# Patient Record
Sex: FEMALE | Race: WHITE | Employment: STUDENT | URBAN - NONMETROPOLITAN AREA
[De-identification: names, ages, dates, MRNs, and addresses within clinical notes are randomized per-mention and may not be internally consistent; named-entity substitution may affect disease eponyms.]

---
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
## 2021-04-01 ENCOUNTER — HOSPITAL ENCOUNTER (INPATIENT)
Age: 21
LOS: 3 days | Discharge: HOME | DRG: 137 | End: 2021-04-05
Attending: EMERGENCY MEDICINE | Admitting: INTERNAL MEDICINE
Payer: MEDICAID

## 2021-04-01 ENCOUNTER — APPOINTMENT (OUTPATIENT)
Dept: GENERAL RADIOLOGY | Age: 21
DRG: 137 | End: 2021-04-01
Payer: MEDICAID

## 2021-04-01 DIAGNOSIS — U07.1 COVID-19: ICD-10-CM

## 2021-04-01 DIAGNOSIS — R11.0 NAUSEA WITHOUT VOMITING: ICD-10-CM

## 2021-04-01 DIAGNOSIS — J96.01 ACUTE RESPIRATORY FAILURE WITH HYPOXIA (HCC): Primary | ICD-10-CM

## 2021-04-01 LAB
ALBUMIN SERPL-MCNC: 3.8 GM/DL (ref 3.4–5)
ALP BLD-CCNC: 76 IU/L (ref 40–129)
ALT SERPL-CCNC: 21 U/L (ref 10–40)
ANION GAP SERPL CALCULATED.3IONS-SCNC: 9 MMOL/L (ref 4–16)
APTT: 42 SECONDS (ref 25.1–37.1)
AST SERPL-CCNC: 22 IU/L (ref 15–37)
BASOPHILS ABSOLUTE: 0 K/CU MM
BASOPHILS RELATIVE PERCENT: 0 % (ref 0–1)
BILIRUB SERPL-MCNC: 0.2 MG/DL (ref 0–1)
BILIRUBIN DIRECT: 0.2 MG/DL (ref 0–0.3)
BILIRUBIN, INDIRECT: 0 MG/DL (ref 0–0.7)
BUN BLDV-MCNC: 4 MG/DL (ref 6–23)
CALCIUM SERPL-MCNC: 8.5 MG/DL (ref 8.3–10.6)
CHLORIDE BLD-SCNC: 98 MMOL/L (ref 99–110)
CO2: 25 MMOL/L (ref 21–32)
CREAT SERPL-MCNC: 0.7 MG/DL (ref 0.6–1.1)
D DIMER: 453 NG/ML(DDU)
DIFFERENTIAL TYPE: ABNORMAL
EOSINOPHILS ABSOLUTE: 0 K/CU MM
EOSINOPHILS RELATIVE PERCENT: 0 % (ref 0–3)
GFR AFRICAN AMERICAN: >60 ML/MIN/1.73M2
GFR NON-AFRICAN AMERICAN: >60 ML/MIN/1.73M2
GLUCOSE BLD-MCNC: 131 MG/DL (ref 70–99)
HCG QUALITATIVE: NEGATIVE
HCT VFR BLD CALC: 30 % (ref 37–47)
HEMOGLOBIN: 9 GM/DL (ref 12.5–16)
IMMATURE NEUTROPHIL %: 0.3 % (ref 0–0.43)
INR BLD: 1.23 INDEX
LACTATE: 1.2 MMOL/L (ref 0.4–2)
LYMPHOCYTES ABSOLUTE: 0.9 K/CU MM
LYMPHOCYTES RELATIVE PERCENT: 29.9 % (ref 25–45)
MCH RBC QN AUTO: 23.4 PG (ref 27–31)
MCHC RBC AUTO-ENTMCNC: 30 % (ref 32–36)
MCV RBC AUTO: 77.9 FL (ref 78–100)
MONOCYTES ABSOLUTE: 0.2 K/CU MM
MONOCYTES RELATIVE PERCENT: 6.9 % (ref 0–4)
NUCLEATED RBC %: 0 %
PDW BLD-RTO: 13.6 % (ref 11.7–14.9)
PLATELET # BLD: 289 K/CU MM (ref 140–440)
PMV BLD AUTO: 9.3 FL (ref 7.5–11.1)
POTASSIUM SERPL-SCNC: 3.6 MMOL/L (ref 3.5–5.1)
PROTHROMBIN TIME: 14.9 SECONDS (ref 11.7–14.5)
RBC # BLD: 3.85 M/CU MM (ref 4.2–5.4)
SEGMENTED NEUTROPHILS ABSOLUTE COUNT: 1.9 K/CU MM
SEGMENTED NEUTROPHILS RELATIVE PERCENT: 62.9 % (ref 34–64)
SODIUM BLD-SCNC: 132 MMOL/L (ref 135–145)
TOTAL IMMATURE NEUTOROPHIL: 0.01 K/CU MM
TOTAL NUCLEATED RBC: 0 K/CU MM
TOTAL PROTEIN: 7.1 GM/DL (ref 6.4–8.2)
WBC # BLD: 3 K/CU MM (ref 4–10.5)

## 2021-04-01 PROCEDURE — 93005 ELECTROCARDIOGRAM TRACING: CPT | Performed by: EMERGENCY MEDICINE

## 2021-04-01 PROCEDURE — 6360000002 HC RX W HCPCS: Performed by: EMERGENCY MEDICINE

## 2021-04-01 PROCEDURE — 85730 THROMBOPLASTIN TIME PARTIAL: CPT

## 2021-04-01 PROCEDURE — 96375 TX/PRO/DX INJ NEW DRUG ADDON: CPT

## 2021-04-01 PROCEDURE — 94640 AIRWAY INHALATION TREATMENT: CPT

## 2021-04-01 PROCEDURE — 80053 COMPREHEN METABOLIC PANEL: CPT

## 2021-04-01 PROCEDURE — 84484 ASSAY OF TROPONIN QUANT: CPT

## 2021-04-01 PROCEDURE — 85379 FIBRIN DEGRADATION QUANT: CPT

## 2021-04-01 PROCEDURE — 99285 EMERGENCY DEPT VISIT HI MDM: CPT

## 2021-04-01 PROCEDURE — 96374 THER/PROPH/DIAG INJ IV PUSH: CPT

## 2021-04-01 PROCEDURE — 87040 BLOOD CULTURE FOR BACTERIA: CPT

## 2021-04-01 PROCEDURE — 82248 BILIRUBIN DIRECT: CPT

## 2021-04-01 PROCEDURE — 6370000000 HC RX 637 (ALT 250 FOR IP): Performed by: EMERGENCY MEDICINE

## 2021-04-01 PROCEDURE — 71045 X-RAY EXAM CHEST 1 VIEW: CPT

## 2021-04-01 PROCEDURE — 2580000003 HC RX 258: Performed by: EMERGENCY MEDICINE

## 2021-04-01 PROCEDURE — 96361 HYDRATE IV INFUSION ADD-ON: CPT

## 2021-04-01 PROCEDURE — 84703 CHORIONIC GONADOTROPIN ASSAY: CPT

## 2021-04-01 PROCEDURE — 85025 COMPLETE CBC W/AUTO DIFF WBC: CPT

## 2021-04-01 PROCEDURE — 85610 PROTHROMBIN TIME: CPT

## 2021-04-01 PROCEDURE — 83605 ASSAY OF LACTIC ACID: CPT

## 2021-04-01 RX ORDER — DEXAMETHASONE SODIUM PHOSPHATE 10 MG/ML
6 INJECTION, SOLUTION INTRAMUSCULAR; INTRAVENOUS ONCE
Status: COMPLETED | OUTPATIENT
Start: 2021-04-01 | End: 2021-04-01

## 2021-04-01 RX ORDER — ONDANSETRON 2 MG/ML
4 INJECTION INTRAMUSCULAR; INTRAVENOUS ONCE
Status: COMPLETED | OUTPATIENT
Start: 2021-04-01 | End: 2021-04-01

## 2021-04-01 RX ORDER — 0.9 % SODIUM CHLORIDE 0.9 %
1000 INTRAVENOUS SOLUTION INTRAVENOUS ONCE
Status: COMPLETED | OUTPATIENT
Start: 2021-04-01 | End: 2021-04-02

## 2021-04-01 RX ORDER — ALBUTEROL SULFATE 90 UG/1
2 AEROSOL, METERED RESPIRATORY (INHALATION) ONCE
Status: COMPLETED | OUTPATIENT
Start: 2021-04-01 | End: 2021-04-01

## 2021-04-01 RX ADMIN — DEXAMETHASONE SODIUM PHOSPHATE 6 MG: 10 INJECTION, SOLUTION INTRAMUSCULAR; INTRAVENOUS at 23:15

## 2021-04-01 RX ADMIN — ONDANSETRON 4 MG: 2 INJECTION INTRAMUSCULAR; INTRAVENOUS at 23:11

## 2021-04-01 RX ADMIN — ALBUTEROL SULFATE 2 PUFF: 90 AEROSOL, METERED RESPIRATORY (INHALATION) at 22:45

## 2021-04-01 RX ADMIN — SODIUM CHLORIDE 1000 ML: 9 INJECTION, SOLUTION INTRAVENOUS at 23:17

## 2021-04-01 ASSESSMENT — PAIN DESCRIPTION - LOCATION: LOCATION: ABDOMEN

## 2021-04-01 ASSESSMENT — PAIN DESCRIPTION - PAIN TYPE: TYPE: ACUTE PAIN

## 2021-04-01 ASSESSMENT — PAIN SCALES - GENERAL: PAINLEVEL_OUTOF10: 3

## 2021-04-02 ENCOUNTER — APPOINTMENT (OUTPATIENT)
Dept: CT IMAGING | Age: 21
DRG: 137 | End: 2021-04-02
Payer: MEDICAID

## 2021-04-02 PROBLEM — J12.82 PNEUMONIA DUE TO COVID-19 VIRUS: Status: ACTIVE | Noted: 2021-04-02

## 2021-04-02 PROBLEM — U07.1 PNEUMONIA DUE TO COVID-19 VIRUS: Status: ACTIVE | Noted: 2021-04-02

## 2021-04-02 LAB
ABO/RH: NORMAL
ALBUMIN SERPL-MCNC: 3.7 GM/DL (ref 3.4–5)
ALP BLD-CCNC: 71 IU/L (ref 40–129)
ALT SERPL-CCNC: 22 U/L (ref 10–40)
ANION GAP SERPL CALCULATED.3IONS-SCNC: 8 MMOL/L (ref 4–16)
ANTIBODY SCREEN: NEGATIVE
AST SERPL-CCNC: 23 IU/L (ref 15–37)
BASOPHILS ABSOLUTE: 0 K/CU MM
BASOPHILS RELATIVE PERCENT: 0 % (ref 0–1)
BILIRUB SERPL-MCNC: 0.2 MG/DL (ref 0–1)
BILIRUBIN DIRECT: 0.2 MG/DL (ref 0–0.3)
BILIRUBIN, INDIRECT: 0 MG/DL (ref 0–0.7)
BUN BLDV-MCNC: 4 MG/DL (ref 6–23)
CALCIUM SERPL-MCNC: 8.4 MG/DL (ref 8.3–10.6)
CHLORIDE BLD-SCNC: 101 MMOL/L (ref 99–110)
CO2: 23 MMOL/L (ref 21–32)
CREAT SERPL-MCNC: 0.6 MG/DL (ref 0.6–1.1)
DIFFERENTIAL TYPE: ABNORMAL
DIFFERENTIAL TYPE: ABNORMAL
EOSINOPHILS ABSOLUTE: 0 K/CU MM
EOSINOPHILS RELATIVE PERCENT: 0 % (ref 0–3)
GFR AFRICAN AMERICAN: >60 ML/MIN/1.73M2
GFR NON-AFRICAN AMERICAN: >60 ML/MIN/1.73M2
GIANT PLATELETS: PRESENT
GLUCOSE BLD-MCNC: 158 MG/DL (ref 70–99)
HCT VFR BLD CALC: 29.9 % (ref 37–47)
HCT VFR BLD CALC: 30.3 % (ref 37–47)
HEMOGLOBIN: 8.8 GM/DL (ref 12.5–16)
HEMOGLOBIN: 9 GM/DL (ref 12.5–16)
HYPOCHROMIA: ABNORMAL
IMMATURE NEUTROPHIL %: 0.5 % (ref 0–0.43)
IRON: 12 UG/DL (ref 37–145)
LYMPHOCYTES ABSOLUTE: 0.4 K/CU MM
LYMPHOCYTES ABSOLUTE: 0.4 K/CU MM
LYMPHOCYTES RELATIVE PERCENT: 16 % (ref 25–45)
LYMPHOCYTES RELATIVE PERCENT: 21.4 % (ref 25–45)
MCH RBC QN AUTO: 22.9 PG (ref 27–31)
MCH RBC QN AUTO: 23.3 PG (ref 27–31)
MCHC RBC AUTO-ENTMCNC: 29.4 % (ref 32–36)
MCHC RBC AUTO-ENTMCNC: 29.7 % (ref 32–36)
MCV RBC AUTO: 77.7 FL (ref 78–100)
MCV RBC AUTO: 78.5 FL (ref 78–100)
MONOCYTES ABSOLUTE: 0.1 K/CU MM
MONOCYTES ABSOLUTE: 0.1 K/CU MM
MONOCYTES RELATIVE PERCENT: 4 % (ref 0–4)
MONOCYTES RELATIVE PERCENT: 4 % (ref 0–4)
NUCLEATED RBC %: 0 %
PCT TRANSFERRIN: 3 % (ref 10–44)
PDW BLD-RTO: 13.6 % (ref 11.7–14.9)
PDW BLD-RTO: 13.6 % (ref 11.7–14.9)
PLATELET # BLD: 284 K/CU MM (ref 140–440)
PLATELET # BLD: 285 K/CU MM (ref 140–440)
PMV BLD AUTO: 9.3 FL (ref 7.5–11.1)
PMV BLD AUTO: 9.5 FL (ref 7.5–11.1)
POLYCHROMASIA: ABNORMAL
POTASSIUM SERPL-SCNC: 4 MMOL/L (ref 3.5–5.1)
PROCALCITONIN: 0.08
RBC # BLD: 3.85 M/CU MM (ref 4.2–5.4)
RBC # BLD: 3.86 M/CU MM (ref 4.2–5.4)
SEGMENTED NEUTROPHILS ABSOLUTE COUNT: 1.5 K/CU MM
SEGMENTED NEUTROPHILS ABSOLUTE COUNT: 2.1 K/CU MM
SEGMENTED NEUTROPHILS RELATIVE PERCENT: 74.1 % (ref 34–64)
SEGMENTED NEUTROPHILS RELATIVE PERCENT: 80 % (ref 34–64)
SODIUM BLD-SCNC: 132 MMOL/L (ref 135–145)
TEAR DROP CELLS: ABNORMAL
TOTAL IMMATURE NEUTOROPHIL: 0.01 K/CU MM
TOTAL IRON BINDING CAPACITY: 404 UG/DL (ref 250–450)
TOTAL NUCLEATED RBC: 0 K/CU MM
TOTAL PROTEIN: 7.3 GM/DL (ref 6.4–8.2)
TROPONIN T: <0.01 NG/ML
UNSATURATED IRON BINDING CAPACITY: 392 UG/DL (ref 110–370)
WBC # BLD: 2 K/CU MM (ref 4–10.5)
WBC # BLD: 2.6 K/CU MM (ref 4–10.5)
WBC # BLD: ABNORMAL 10*3/UL

## 2021-04-02 PROCEDURE — 93010 ELECTROCARDIOGRAM REPORT: CPT | Performed by: INTERNAL MEDICINE

## 2021-04-02 PROCEDURE — 6360000002 HC RX W HCPCS: Performed by: INTERNAL MEDICINE

## 2021-04-02 PROCEDURE — 86900 BLOOD TYPING SEROLOGIC ABO: CPT

## 2021-04-02 PROCEDURE — 83540 ASSAY OF IRON: CPT

## 2021-04-02 PROCEDURE — 6360000004 HC RX CONTRAST MEDICATION: Performed by: EMERGENCY MEDICINE

## 2021-04-02 PROCEDURE — 85025 COMPLETE CBC W/AUTO DIFF WBC: CPT

## 2021-04-02 PROCEDURE — 82248 BILIRUBIN DIRECT: CPT

## 2021-04-02 PROCEDURE — 85007 BL SMEAR W/DIFF WBC COUNT: CPT

## 2021-04-02 PROCEDURE — 83550 IRON BINDING TEST: CPT

## 2021-04-02 PROCEDURE — 85027 COMPLETE CBC AUTOMATED: CPT

## 2021-04-02 PROCEDURE — 80053 COMPREHEN METABOLIC PANEL: CPT

## 2021-04-02 PROCEDURE — 86901 BLOOD TYPING SEROLOGIC RH(D): CPT

## 2021-04-02 PROCEDURE — XW033E5 INTRODUCTION OF REMDESIVIR ANTI-INFECTIVE INTO PERIPHERAL VEIN, PERCUTANEOUS APPROACH, NEW TECHNOLOGY GROUP 5: ICD-10-PCS | Performed by: INTERNAL MEDICINE

## 2021-04-02 PROCEDURE — XW13325 TRANSFUSION OF CONVALESCENT PLASMA (NONAUTOLOGOUS) INTO PERIPHERAL VEIN, PERCUTANEOUS APPROACH, NEW TECHNOLOGY GROUP 5: ICD-10-PCS | Performed by: INTERNAL MEDICINE

## 2021-04-02 PROCEDURE — 94761 N-INVAS EAR/PLS OXIMETRY MLT: CPT

## 2021-04-02 PROCEDURE — 2580000003 HC RX 258: Performed by: INTERNAL MEDICINE

## 2021-04-02 PROCEDURE — 2500000003 HC RX 250 WO HCPCS: Performed by: INTERNAL MEDICINE

## 2021-04-02 PROCEDURE — 86850 RBC ANTIBODY SCREEN: CPT

## 2021-04-02 PROCEDURE — 96361 HYDRATE IV INFUSION ADD-ON: CPT

## 2021-04-02 PROCEDURE — 71275 CT ANGIOGRAPHY CHEST: CPT

## 2021-04-02 PROCEDURE — 6360000002 HC RX W HCPCS: Performed by: PHYSICIAN ASSISTANT

## 2021-04-02 PROCEDURE — 84145 PROCALCITONIN (PCT): CPT

## 2021-04-02 PROCEDURE — 2060000000 HC ICU INTERMEDIATE R&B

## 2021-04-02 RX ORDER — 0.9 % SODIUM CHLORIDE 0.9 %
30 INTRAVENOUS SOLUTION INTRAVENOUS PRN
Status: DISCONTINUED | OUTPATIENT
Start: 2021-04-02 | End: 2021-04-05 | Stop reason: HOSPADM

## 2021-04-02 RX ORDER — VITAMIN B COMPLEX
2000 TABLET ORAL DAILY
Status: DISCONTINUED | OUTPATIENT
Start: 2021-04-02 | End: 2021-04-05

## 2021-04-02 RX ORDER — POLYETHYLENE GLYCOL 3350 17 G/17G
17 POWDER, FOR SOLUTION ORAL DAILY PRN
Status: DISCONTINUED | OUTPATIENT
Start: 2021-04-02 | End: 2021-04-05 | Stop reason: HOSPADM

## 2021-04-02 RX ORDER — ACETAMINOPHEN 325 MG/1
650 TABLET ORAL EVERY 6 HOURS PRN
Status: DISCONTINUED | OUTPATIENT
Start: 2021-04-02 | End: 2021-04-05 | Stop reason: HOSPADM

## 2021-04-02 RX ORDER — DEXAMETHASONE SODIUM PHOSPHATE 10 MG/ML
6 INJECTION, SOLUTION INTRAMUSCULAR; INTRAVENOUS DAILY
Status: DISCONTINUED | OUTPATIENT
Start: 2021-04-02 | End: 2021-04-05 | Stop reason: HOSPADM

## 2021-04-02 RX ORDER — ACETAMINOPHEN 650 MG/1
650 SUPPOSITORY RECTAL EVERY 6 HOURS PRN
Status: DISCONTINUED | OUTPATIENT
Start: 2021-04-02 | End: 2021-04-05 | Stop reason: HOSPADM

## 2021-04-02 RX ORDER — SODIUM CHLORIDE 0.9 % (FLUSH) 0.9 %
10 SYRINGE (ML) INJECTION EVERY 12 HOURS SCHEDULED
Status: DISCONTINUED | OUTPATIENT
Start: 2021-04-02 | End: 2021-04-05 | Stop reason: HOSPADM

## 2021-04-02 RX ORDER — FERROUS SULFATE 325(65) MG
325 TABLET ORAL 2 TIMES DAILY WITH MEALS
Status: DISCONTINUED | OUTPATIENT
Start: 2021-04-02 | End: 2021-04-05

## 2021-04-02 RX ORDER — SODIUM CHLORIDE 0.9 % (FLUSH) 0.9 %
10 SYRINGE (ML) INJECTION PRN
Status: DISCONTINUED | OUTPATIENT
Start: 2021-04-02 | End: 2021-04-05 | Stop reason: HOSPADM

## 2021-04-02 RX ORDER — PROMETHAZINE HYDROCHLORIDE 25 MG/1
12.5 TABLET ORAL EVERY 6 HOURS PRN
Status: DISCONTINUED | OUTPATIENT
Start: 2021-04-02 | End: 2021-04-05 | Stop reason: HOSPADM

## 2021-04-02 RX ORDER — DEXAMETHASONE 4 MG/1
6 TABLET ORAL DAILY
Status: DISCONTINUED | OUTPATIENT
Start: 2021-04-02 | End: 2021-04-02

## 2021-04-02 RX ORDER — ONDANSETRON 2 MG/ML
4 INJECTION INTRAMUSCULAR; INTRAVENOUS EVERY 6 HOURS PRN
Status: DISCONTINUED | OUTPATIENT
Start: 2021-04-02 | End: 2021-04-05 | Stop reason: HOSPADM

## 2021-04-02 RX ORDER — SODIUM CHLORIDE 9 MG/ML
25 INJECTION, SOLUTION INTRAVENOUS PRN
Status: DISCONTINUED | OUTPATIENT
Start: 2021-04-02 | End: 2021-04-05 | Stop reason: HOSPADM

## 2021-04-02 RX ORDER — SODIUM CHLORIDE 9 MG/ML
INJECTION, SOLUTION INTRAVENOUS PRN
Status: DISCONTINUED | OUTPATIENT
Start: 2021-04-02 | End: 2021-04-05 | Stop reason: HOSPADM

## 2021-04-02 RX ADMIN — IOPAMIDOL 75 ML: 755 INJECTION, SOLUTION INTRAVENOUS at 00:33

## 2021-04-02 RX ADMIN — ONDANSETRON 4 MG: 2 INJECTION INTRAMUSCULAR; INTRAVENOUS at 20:31

## 2021-04-02 RX ADMIN — SODIUM CHLORIDE 30 ML: 9 INJECTION, SOLUTION INTRAVENOUS at 07:41

## 2021-04-02 RX ADMIN — DEXAMETHASONE SODIUM PHOSPHATE 6 MG: 10 INJECTION, SOLUTION INTRAMUSCULAR; INTRAVENOUS at 09:41

## 2021-04-02 RX ADMIN — REMDESIVIR 200 MG: 100 INJECTION, POWDER, LYOPHILIZED, FOR SOLUTION INTRAVENOUS at 06:57

## 2021-04-02 RX ADMIN — SODIUM CHLORIDE, PRESERVATIVE FREE 10 ML: 5 INJECTION INTRAVENOUS at 20:29

## 2021-04-02 RX ADMIN — ENOXAPARIN SODIUM 30 MG: 30 INJECTION SUBCUTANEOUS at 20:28

## 2021-04-02 RX ADMIN — SODIUM CHLORIDE, PRESERVATIVE FREE 10 ML: 5 INJECTION INTRAVENOUS at 07:38

## 2021-04-02 RX ADMIN — ENOXAPARIN SODIUM 30 MG: 30 INJECTION SUBCUTANEOUS at 09:43

## 2021-04-02 NOTE — ED PROVIDER NOTES
CHIEF COMPLAINT    Chief Complaint   Patient presents with    Cough    Shortness of Breath     HPI  Danelle Grover is a 24 y.o. female who presents to the ED with complaints of increasing shortness of breath with cough and nausea. Patient was around her sister that tested positive for COVID-19 pneumonia and on Tuesday morning lost her sense of taste and smell. She had COVID-19 testing performed at urgent care facility that same day and tested positive for COVID-19. Since yesterday she has been experiencing some increasing shortness of breath with cough that is nonproductive. The shortness of breath is rated as severe and exacerbated with exertion. She also states she has some lightheadedness with positional changes and nausea without vomiting. No underlying cardiac or pulmonary disease that she is aware of. Has experienced some subjective fever/chills. Denies chest pain, abdominal pain, numbness, tingling, history of DVT/PE. REVIEW OF SYSTEMS  Constitutional: Complains of fevers and chills. Eye: No visual changes  HENT: No earache or sore throat. Resp: Complains of shortness of breath  Cardio: No chest pain or palpitations. GI: No abdominal pain, nausea, vomiting, constipation or diarrhea. No melena. : No dysuria, urgency or frequency. Endocrine: No heat intolerance, no cold intolerance, no polydipsia   Lymphatics: No adenopathy  Musculoskeletal: No new muscle aches or joint pain. Neuro: No headaches. Psych: No homicidal or suicidal thoughts  Skin: No rash, No itching. ?  ? PAST MEDICAL HISTORY  History reviewed. No pertinent past medical history. FAMILY HISTORY  History reviewed. No pertinent family history.   SOCIAL HISTORY  Social History     Socioeconomic History    Marital status: Single     Spouse name: None    Number of children: None    Years of education: None    Highest education level: None   Occupational History    None   Social Needs    Financial resource strain: None  Food insecurity     Worry: None     Inability: None    Transportation needs     Medical: None     Non-medical: None   Tobacco Use    Smoking status: Never Smoker    Smokeless tobacco: Never Used   Substance and Sexual Activity    Alcohol use: No    Drug use: No    Sexual activity: Never   Lifestyle    Physical activity     Days per week: None     Minutes per session: None    Stress: None   Relationships    Social connections     Talks on phone: None     Gets together: None     Attends Faith service: None     Active member of club or organization: None     Attends meetings of clubs or organizations: None     Relationship status: None    Intimate partner violence     Fear of current or ex partner: None     Emotionally abused: None     Physically abused: None     Forced sexual activity: None   Other Topics Concern    None   Social History Narrative    ** Merged History Encounter **            SURGICAL HISTORY  History reviewed. No pertinent surgical history. CURRENT MEDICATIONS  Previous Medications    MEDROXYPROGESTERONE (DEPO-PROVERA) 150 MG/ML INJECTION    Inject 1 mL into the muscle once for 1 dose    POLYETHYLENE GLYCOL (GLYCOLAX) PACKET    TAKE 1 PACKET (17 G) BY MOUTH DAILY, MIX WITH WATER     ALLERGIES  No Known Allergies    Nursing notes reviewed by myself for past medical history, family history, social history, surgical history, current medications, and allergies. PHYSICAL EXAM  VITAL SIGNS: Triage VS:    ED Triage Vitals [04/01/21 2209]   Enc Vitals Group      BP (!) 177/101      Pulse 132      Resp 20      Temp 99.7 °F (37.6 °C)      Temp Source Oral      SpO2 94 %      Weight 250 lb (113.4 kg)      Height 5' 8\" (1.727 m)      Head Circumference       Peak Flow       Pain Score       Pain Loc       Pain Edu? Excl. in [address]?       Constitutional: Well developed, Well nourished, appears uncomfortable  HENT: Normocephalic, Atraumatic, Bilateral external ears normal, Oropharynx moist, No oral exudates, Nose normal.   Eyes: PERRL, EOMI, Conjunctiva normal, No discharge. No scleral icterus. Neck: Normal range of motion, No tenderness, Supple. Lymphatic: No lymphadenopathy noted. Cardiovascular: Tachycardic normal rhythm, No murmurs, gallops or rubs. Thorax & Lungs: Occasional scattered rhonchi, mild tachypnea. No retractions  Abdomen: Soft, No tenderness, No masses, No pulsatile masses, No distention, Normal bowel sounds  Skin: Warm, Dry, Pink, No mottling, No erythema, No rash. Back: No tenderness, No CVA tenderness. Extremities: No edema, No tenderness, No cyanosis, Normal perfusion, No clubbing. Musculoskeletal: Good range of motion in all major joints as observed. No major deformities noted. Neurologic: Alert & oriented x 3, Normal motor function, Normal sensory function, CN II-XII grossly intact as tested, No focal deficits noted. Psychiatric: Affect normal, Judgment normal, Mood normal.   EKG  Per my interpretation demonstrates sinus tachycardia at a rate of 128 bpm.  Normal axis. Normal intervals. No acute ST segment changes.   RADIOLOGY  Labs Reviewed   CBC WITH AUTO DIFFERENTIAL - Abnormal; Notable for the following components:       Result Value    WBC 3.0 (*)     RBC 3.85 (*)     Hemoglobin 9.0 (*)     Hematocrit 30.0 (*)     MCV 77.9 (*)     MCH 23.4 (*)     MCHC 30.0 (*)     Monocytes % 6.9 (*)     All other components within normal limits   BASIC METABOLIC PANEL - Abnormal; Notable for the following components:    Sodium 132 (*)     Chloride 98 (*)     BUN 4 (*)     Glucose 131 (*)     All other components within normal limits   APTT - Abnormal; Notable for the following components:    aPTT 42.0 (*)     All other components within normal limits   PROTIME-INR - Abnormal; Notable for the following components:    Protime 14.9 (*)     All other components within normal limits   D-DIMER, QUANTITATIVE - Abnormal; Notable for the following components:    D-Dimer, Quant 453 scattered groundglass opacities but no evidence of pulmonary embolism. Patient did have oxygen saturation while sleeping and is on 2 L nasal cannula. At this time she would benefit from hospitalization for management of her acute respiratory failure secondary to COVID-19 pneumonia. Patient discussed with hospitalist team who agreed to hospitalize patient for further management. Appropriate PPE utilized as indicated for entire patient encounter? Time of Disposition: See timeline  ? New Prescriptions    No medications on file     FINAL IMPRESSION  1. Acute respiratory failure with hypoxia ([address]    2. Nausea without vomiting    3. COVID-19        Electronically signed by:  Robbi Guido DO, 4/2/2021         Robbi Guido DO  04/02/21 2043

## 2021-04-02 NOTE — ED NOTES
Pt is in bed in comfortable position with no distress noted, RR unlabored.  Call light within reach      [place] SHANNAN Harman  04/02/21 6014

## 2021-04-02 NOTE — ED NOTES
Morning labs drawn. Cup provided for sputum sample. BSC emptied and clean hat placed so that pt may provide a clean urine sample.       Mary Kay Bhatti RN  04/02/21 4835

## 2021-04-02 NOTE — ED NOTES
Patient ambulated to room. Patients oxygen dropped to 86% and heart rate 142. Patient placed in bed and went back up to 93%.  Dr. Raleigh Magaña made aware     Avril Merritt RN  04/01/21 0896

## 2021-04-02 NOTE — ED NOTES
Patient presents to Ed with complaints of shortness of breath and cough,. Reports tested positive for Covid 19 on Tuesday. Reports pain to  Her sides.       Beny Villasenor RN  04/01/21 4757

## 2021-04-02 NOTE — H&P
History and Physical      Name:  Claudine Ortiz /Age/Sex: 2000  (24 y.o. female)   MRN & CSN:  [id number] & [id number] Admission Date/Time: 2021 10:16 PM   Location:  [facility] PCP: Ana Griffiths MD       Hospital Day: 2    History of Present Illness:     Chief Complaint: SOB  Claudine Ortiz is a 24 y.o.  female  who presents with SOB. She was tested positive for COVID on Tuesday and for the past 2 nights not able to sleep well. She started feeling like puking and pass out. She has associated dry cough and SOB. SOB has slightly improved. Has loss of sensation to smell, which is slightly back. Denies having any chest pain, abdominal pain, diarrhea, constipation, dysuria, focal weakness, tingling, numbness. She was exposed to her sister, who tested positive on . Ten point ROS reviewed negative, unless as noted above    Objective:   No intake or output data in the 24 hours ending 21 0332   Vitals:   Vitals:    21 0303   BP: (!) 108/59   Pulse: 115   Resp:    Temp:    SpO2: 98%     Physical Exam:   General Appearance: alert and oriented to person, place and time, in no acute distress  Cardiovascular: normal rate, regular rhythm, normal S1 and S2  Pulmonary/Chest: clear to auscultation bilaterally- no wheezes, rales or rhonchi, normal air movement, no respiratory distress  Abdomen: soft, non-tender, non-distended, normal bowel sounds, no masses   Extremities: no cyanosis, clubbing or edema, pulse   Skin: warm and dry, no rash or erythema  Head: normocephalic and atraumatic  Eyes: pupils equal, round, and reactive to light  Neck: supple and non-tender without mass, no thyromegaly   Musculoskeletal: normal range of motion, no joint swelling, deformity or tenderness  Neurological: alert, oriented, normal speech, no focal findings or movement disorder noted    Past Medical History:    History reviewed. No pertinent past medical history.   PSHX:  has no past surgical history on file. Allergies: No Known Allergies    FAM HX:Reviwed but non contributory  Soc HX:   Social History     Socioeconomic History    Marital status: Single     Spouse name: None    Number of children: None    Years of education: None    Highest education level: None   Occupational History    None   Social Needs    Financial resource strain: None    Food insecurity     Worry: None     Inability: None    Transportation needs     Medical: None     Non-medical: None   Tobacco Use    Smoking status: Never Smoker    Smokeless tobacco: Never Used   Substance and Sexual Activity    Alcohol use: No    Drug use: No    Sexual activity: Never   Lifestyle    Physical activity     Days per week: None     Minutes per session: None    Stress: None   Relationships    Social connections     Talks on phone: None     Gets together: None     Attends Anabaptism service: None     Active member of club or organization: None     Attends meetings of clubs or organizations: None     Relationship status: None    Intimate partner violence     Fear of current or ex partner: None     Emotionally abused: None     Physically abused: None     Forced sexual activity: None   Other Topics Concern    None   Social History Narrative    ** Merged History Encounter **            Prior to Admission medications    Medication Sig Start Date End Date Taking?  Authorizing Provider   medroxyPROGESTERone (DEPO-PROVERA) 150 MG/ML injection Inject 1 mL into the muscle once for 1 dose 2/18/19 2/18/19  Lety Lion MD   polyethylene glycol [facility]) packet TAKE 1 PACKET (17 G) BY MOUTH DAILY, MIX WITH WATER 4/28/17   Lety Lion MD     Medications:   Medications:    Infusions:   PRN Meds:     Labs:     CBC:   Lab Results   Component Value Date    WBC 3.0 04/01/2021    RBC 3.85 04/01/2021    HGB 9.0 04/01/2021    HCT 30.0 04/01/2021     04/01/2021    MCV 77.9 04/01/2021     BMP:    Lab Results   Component Value Date     04/01/2021    K 3.6 04/01/2021    CL 98 04/01/2021    CO2 25 04/01/2021    BUN 4 04/01/2021    CREATININE 0.7 04/01/2021    GLUCOSE 131 04/01/2021    CALCIUM 8.5 04/01/2021     Hepatic Function Panel:    Lab Results   Component Value Date    ALKPHOS 76 04/01/2021    AST 22 04/01/2021    ALT 21 04/01/2021    PROT 7.1 04/01/2021    LABALBU 3.8 04/01/2021    BILITOT 0.2 04/01/2021     Magnesium:  No results found for: MG  Cardiac Enzymes: No results found for: CKTOTAL, CKMB, CKMBINDEX, TROPONINI  LDH:  No results found for: LDH  PT/INR:    Lab Results   Component Value Date    PROTIME 14.9 04/01/2021    INR 1.23 04/01/2021     U/A: No results found for: NITRITE, LEUKOCYTESUR, PHUR, WBCUA, RBCUA, BACTERIA, SPECGRAV, BLOODU, GLUCOSEU  ABG:    Lab Results   Component Value Date    ZFA8TTQ 28.0 02/16/2019    PO2ART 290 02/16/2019    REX7IYD 20.9 02/16/2019     TSH:  No results found for: TSH  Cardiac Enzymes: No results found for: CKTOTAL, CKMB, CKMBINDEX, TROPONINI    Assessment and Plan:   Re Valderrama is a 24 y.o.  female  who presents with SOB    Acute hypoxemic respiratory failure  COVID-19 pneumonia  Symptoms started 3 days back on 3/30/2021  Chest x-ray with bilateral infiltrates, confirmed by CT chest  CTA chest rule out PE  Currently requiring 2 L of oxygen  Convalescent plasma ordered  Dexamethasone  Lovenox twice daily  Pharmacy consult for remdesivir    Hyponatremia, mild  Leukopenia  Macrocytic anemia/iron deficiency    Diet DIET GENERAL;   DVT Prophylaxis [x] Lovenox, []  Heparin, [] SCDs, [] Ambulation   GI Prophylaxis [] PPI,  [] H2 Blocker,  [] Carafate,  [x] Diet/Tube Feeds   Code Status  full code   Disposition Patient requires continued admission due to COVID-19 pneumonia   MDM [] Low, [x] Moderate,[]  High       Electronically signed by Alanna Woodall MD on 4/2/2021 at 3:32 AM

## 2021-04-02 NOTE — ED NOTES
Pt placed on 2L NC for comfort. O2 saturation drops into the high 80's when resting.       Soto Jurado RN  04/02/21 0104

## 2021-04-03 LAB
ALBUMIN SERPL-MCNC: 4.1 GM/DL (ref 3.4–5)
ALP BLD-CCNC: 74 IU/L (ref 40–129)
ALT SERPL-CCNC: 19 U/L (ref 10–40)
ANION GAP SERPL CALCULATED.3IONS-SCNC: 13 MMOL/L (ref 4–16)
AST SERPL-CCNC: 22 IU/L (ref 15–37)
BASOPHILS ABSOLUTE: 0 K/CU MM
BASOPHILS RELATIVE PERCENT: 0 % (ref 0–1)
BILIRUB SERPL-MCNC: 0.2 MG/DL (ref 0–1)
BILIRUBIN DIRECT: 0.2 MG/DL (ref 0–0.3)
BILIRUBIN, INDIRECT: 0 MG/DL (ref 0–0.7)
BUN BLDV-MCNC: 8 MG/DL (ref 6–23)
CALCIUM SERPL-MCNC: 8.8 MG/DL (ref 8.3–10.6)
CHLORIDE BLD-SCNC: 99 MMOL/L (ref 99–110)
CO2: 23 MMOL/L (ref 21–32)
COMPONENT: NORMAL
COMPONENT: NORMAL
CREAT SERPL-MCNC: 0.7 MG/DL (ref 0.6–1.1)
DIFFERENTIAL TYPE: ABNORMAL
EOSINOPHILS ABSOLUTE: 0 K/CU MM
EOSINOPHILS RELATIVE PERCENT: 0 % (ref 0–3)
FERRITIN: 29 NG/ML (ref 15–150)
FIBRINOGEN LEVEL: 431 MG/DL (ref 196.9–442.1)
GFR AFRICAN AMERICAN: >60 ML/MIN/1.73M2
GFR NON-AFRICAN AMERICAN: >60 ML/MIN/1.73M2
GLUCOSE BLD-MCNC: 116 MG/DL (ref 70–99)
HCT VFR BLD CALC: 31 % (ref 37–47)
HEMOGLOBIN: 10.2 GM/DL (ref 12.5–16)
IMMATURE NEUTROPHIL %: 0.4 % (ref 0–0.43)
LYMPHOCYTES ABSOLUTE: 1.3 K/CU MM
LYMPHOCYTES RELATIVE PERCENT: 26 % (ref 25–45)
Lab: 1
MCH RBC QN AUTO: 25.9 PG (ref 27–31)
MCHC RBC AUTO-ENTMCNC: 32.9 % (ref 32–36)
MCV RBC AUTO: 78.7 FL (ref 78–100)
MONOCYTES ABSOLUTE: 0.3 K/CU MM
MONOCYTES RELATIVE PERCENT: 6.4 % (ref 0–4)
NUCLEATED RBC %: 0 %
PDW BLD-RTO: 13.8 % (ref 11.7–14.9)
PLATELET # BLD: 310 K/CU MM (ref 140–440)
PMV BLD AUTO: 9.5 FL (ref 7.5–11.1)
POTASSIUM SERPL-SCNC: 3.9 MMOL/L (ref 3.5–5.1)
PROCALCITONIN: 0.1
RBC # BLD: 3.94 M/CU MM (ref 4.2–5.4)
SEGMENTED NEUTROPHILS ABSOLUTE COUNT: 3.5 K/CU MM
SEGMENTED NEUTROPHILS RELATIVE PERCENT: 67.2 % (ref 34–64)
SODIUM BLD-SCNC: 135 MMOL/L (ref 135–145)
STATUS: NORMAL
TOTAL IMMATURE NEUTOROPHIL: 0.02 K/CU MM
TOTAL NUCLEATED RBC: 0 K/CU MM
TOTAL PROTEIN: 7.5 GM/DL (ref 6.4–8.2)
TRANSFUSION STATUS: NORMAL
UNIT DIVISION: NORMAL
UNIT NUMBER: NORMAL
WBC # BLD: 5.2 K/CU MM (ref 4–10.5)

## 2021-04-03 PROCEDURE — 2060000000 HC ICU INTERMEDIATE R&B

## 2021-04-03 PROCEDURE — 1200000000 HC SEMI PRIVATE

## 2021-04-03 PROCEDURE — 82248 BILIRUBIN DIRECT: CPT

## 2021-04-03 PROCEDURE — 2580000003 HC RX 258: Performed by: INTERNAL MEDICINE

## 2021-04-03 PROCEDURE — 80053 COMPREHEN METABOLIC PANEL: CPT

## 2021-04-03 PROCEDURE — 2500000003 HC RX 250 WO HCPCS: Performed by: INTERNAL MEDICINE

## 2021-04-03 PROCEDURE — 80076 HEPATIC FUNCTION PANEL: CPT

## 2021-04-03 PROCEDURE — 82728 ASSAY OF FERRITIN: CPT

## 2021-04-03 PROCEDURE — 6360000002 HC RX W HCPCS: Performed by: PHYSICIAN ASSISTANT

## 2021-04-03 PROCEDURE — 6360000002 HC RX W HCPCS: Performed by: INTERNAL MEDICINE

## 2021-04-03 PROCEDURE — 85384 FIBRINOGEN ACTIVITY: CPT

## 2021-04-03 PROCEDURE — 84145 PROCALCITONIN (PCT): CPT

## 2021-04-03 PROCEDURE — 85025 COMPLETE CBC W/AUTO DIFF WBC: CPT

## 2021-04-03 RX ADMIN — ENOXAPARIN SODIUM 30 MG: 30 INJECTION SUBCUTANEOUS at 22:29

## 2021-04-03 RX ADMIN — DEXAMETHASONE SODIUM PHOSPHATE 6 MG: 10 INJECTION, SOLUTION INTRAMUSCULAR; INTRAVENOUS at 09:16

## 2021-04-03 RX ADMIN — ENOXAPARIN SODIUM 30 MG: 30 INJECTION SUBCUTANEOUS at 09:16

## 2021-04-03 RX ADMIN — REMDESIVIR 100 MG: 100 INJECTION, POWDER, LYOPHILIZED, FOR SOLUTION INTRAVENOUS at 06:00

## 2021-04-03 RX ADMIN — SODIUM CHLORIDE, PRESERVATIVE FREE 10 ML: 5 INJECTION INTRAVENOUS at 22:29

## 2021-04-03 NOTE — PROGRESS NOTES
Hospitalist Progress Note      Name:  Sarah Pang /Age/Sex: 2000  (24 y.o. female)   MRN & CSN:  [id number] & [id number] Admission Date/Time: 2021 10:16 PM   Location:  -A PCP: Eagle Garner MD         Hospital Day: 3    Assessment and Plan:   Sarah Pang is a 24 y.o.  female  who presents with shortness of breath. She tested positive for COVID-19 on 3/30/2021.    -Acute hypoxic respiratory failure secondary to COVID-19 pneumonia  Infection confirmed on 3/30/2021. CTA chest-no PE. Bilateral groundglass opacities. Oxygen requirement-2 L/min via nasal cannula  Procalcitonin pending. Plan:  Continue dexamethasone-started 2021. Convalescent plasma-given 2021. Remdesivir-started 2021. Wean oxygen as tolerated  Follow-up procalcitonin    -Iron deficiency anemia  Iron 12, transferrin 3%, TIBC 392  Plan: Continue oral iron replacement.    -Leukopenia due to COVID-19 infection: Resolved  -Mild hyponatremia: Resolved with fluid resuscitation. Diet DIET GENERAL;   DVT Prophylaxis [x] Lovenox, []  Heparin, [] SCDs, [] Ambulation   GI Prophylaxis [] PPI,  [] H2 Blocker,  [] Carafate,  [] Diet/Tube Feeds   Code Status Full Code   Disposition  Home   MDM [] Low, [x] Moderate,[]  High     History of Present Illness:   Patient seen and examined. She denies shortness of breath at rest but feels winded with exertion. Oxygen saturation in the high 80s to low 90s on room air. Ten point ROS reviewed negative, unless as noted above    Objective: Intake/Output Summary (Last 24 hours) at 4/3/2021 1039  Last data filed at 2021 1700  Gross per 24 hour   Intake 180.83 ml   Output --   Net 180.83 ml      Vitals:   Vitals:    21 0926   BP:    Pulse: 109   Resp:    Temp:    SpO2: 96%     Physical Exam:   GEN Awake female, sitting upright in bed. RESP breathing comfortably on room air but oxygen saturation at 88%. CARDIO/VASC S1/S2 auscultated.  Regular tachycardia. No peripheral edema. GI Abdomen is soft without significant tenderness, masses, or guarding. Bowel sounds are normoactive. MSK No gross joint deformities. SKIN Normal coloration, warm, dry. NEURO normal speech, no lateralizing weakness.   PSYCH Awake, alert, oriented x 3/     Medications:   Medications:    sodium chloride flush  10 mL Intravenous 2 times per day    enoxaparin  30 mg Subcutaneous BID    Vitamin D  2,000 Units Oral Daily    ferrous sulfate  325 mg Oral BID WC    remdesivir IVPB  100 mg Intravenous Q24H    dexamethasone  6 mg Intravenous Daily      Infusions:    sodium chloride      sodium chloride       PRN Meds: sodium chloride flush, 10 mL, PRN  sodium chloride, 25 mL, PRN  promethazine, 12.5 mg, Q6H PRN    Or  ondansetron, 4 mg, Q6H PRN  polyethylene glycol, 17 g, Daily PRN  acetaminophen, 650 mg, Q6H PRN    Or  acetaminophen, 650 mg, Q6H PRN  sodium chloride, , PRN  sodium chloride, 30 mL, PRN        CBC   Recent Labs     04/02/21  0420 04/02/21  0630 04/03/21  0558   WBC 2.6* 2.0* 5.2   HGB 9.0* 8.8* 10.2*   HCT 30.3* 29.9* 31.0*    285 310      BMP   Recent Labs     04/01/21  2301 04/02/21  0630 04/03/21  0558   * 132* 135   K 3.6 4.0 3.9   CL 98* 101 99   CO2 25 23 23   BUN 4* 4* 8   CREATININE 0.7 0.6 0.7       Radiology report reviewed    Electronically signed by Deepti Bustos MD on 4/3/2021 at 10:39 AM

## 2021-04-04 LAB
ALBUMIN SERPL-MCNC: 4 GM/DL (ref 3.4–5)
ALP BLD-CCNC: 70 IU/L (ref 40–129)
ALT SERPL-CCNC: 15 U/L (ref 10–40)
ANION GAP SERPL CALCULATED.3IONS-SCNC: 9 MMOL/L (ref 4–16)
AST SERPL-CCNC: 16 IU/L (ref 15–37)
BASOPHILS ABSOLUTE: 0 K/CU MM
BASOPHILS RELATIVE PERCENT: 0 % (ref 0–1)
BILIRUB SERPL-MCNC: 0.2 MG/DL (ref 0–1)
BILIRUBIN DIRECT: 0.2 MG/DL (ref 0–0.3)
BILIRUBIN, INDIRECT: 0 MG/DL (ref 0–0.7)
BUN BLDV-MCNC: 11 MG/DL (ref 6–23)
CALCIUM SERPL-MCNC: 8.7 MG/DL (ref 8.3–10.6)
CHLORIDE BLD-SCNC: 104 MMOL/L (ref 99–110)
CO2: 25 MMOL/L (ref 21–32)
CREAT SERPL-MCNC: 0.6 MG/DL (ref 0.6–1.1)
DIFFERENTIAL TYPE: ABNORMAL
EOSINOPHILS ABSOLUTE: 0 K/CU MM
EOSINOPHILS RELATIVE PERCENT: 0 % (ref 0–3)
GFR AFRICAN AMERICAN: >60 ML/MIN/1.73M2
GFR NON-AFRICAN AMERICAN: >60 ML/MIN/1.73M2
GLUCOSE BLD-MCNC: 114 MG/DL (ref 70–99)
HCT VFR BLD CALC: 30.7 % (ref 37–47)
HEMOGLOBIN: 9 GM/DL (ref 12.5–16)
IMMATURE NEUTROPHIL %: 0.3 % (ref 0–0.43)
LYMPHOCYTES ABSOLUTE: 1.4 K/CU MM
LYMPHOCYTES RELATIVE PERCENT: 44.1 % (ref 25–45)
MCH RBC QN AUTO: 23 PG (ref 27–31)
MCHC RBC AUTO-ENTMCNC: 29.3 % (ref 32–36)
MCV RBC AUTO: 78.5 FL (ref 78–100)
MONOCYTES ABSOLUTE: 0.3 K/CU MM
MONOCYTES RELATIVE PERCENT: 9.6 % (ref 0–4)
NUCLEATED RBC %: 0 %
PDW BLD-RTO: 13.9 % (ref 11.7–14.9)
PLATELET # BLD: 314 K/CU MM (ref 140–440)
PMV BLD AUTO: 9.8 FL (ref 7.5–11.1)
POTASSIUM SERPL-SCNC: 4 MMOL/L (ref 3.5–5.1)
RBC # BLD: 3.91 M/CU MM (ref 4.2–5.4)
SEGMENTED NEUTROPHILS ABSOLUTE COUNT: 1.5 K/CU MM
SEGMENTED NEUTROPHILS RELATIVE PERCENT: 46 % (ref 34–64)
SODIUM BLD-SCNC: 138 MMOL/L (ref 135–145)
TOTAL IMMATURE NEUTOROPHIL: 0.01 K/CU MM
TOTAL NUCLEATED RBC: 0 K/CU MM
TOTAL PROTEIN: 7 GM/DL (ref 6.4–8.2)
WBC # BLD: 3.2 K/CU MM (ref 4–10.5)

## 2021-04-04 PROCEDURE — 6360000002 HC RX W HCPCS: Performed by: PHYSICIAN ASSISTANT

## 2021-04-04 PROCEDURE — 2580000003 HC RX 258: Performed by: INTERNAL MEDICINE

## 2021-04-04 PROCEDURE — 6370000000 HC RX 637 (ALT 250 FOR IP): Performed by: INTERNAL MEDICINE

## 2021-04-04 PROCEDURE — 2060000000 HC ICU INTERMEDIATE R&B

## 2021-04-04 PROCEDURE — 87449 NOS EACH ORGANISM AG IA: CPT

## 2021-04-04 PROCEDURE — 6360000002 HC RX W HCPCS: Performed by: INTERNAL MEDICINE

## 2021-04-04 PROCEDURE — 87899 AGENT NOS ASSAY W/OPTIC: CPT

## 2021-04-04 PROCEDURE — 82248 BILIRUBIN DIRECT: CPT

## 2021-04-04 PROCEDURE — 2500000003 HC RX 250 WO HCPCS: Performed by: INTERNAL MEDICINE

## 2021-04-04 PROCEDURE — 85025 COMPLETE CBC W/AUTO DIFF WBC: CPT

## 2021-04-04 PROCEDURE — 80053 COMPREHEN METABOLIC PANEL: CPT

## 2021-04-04 PROCEDURE — 1200000000 HC SEMI PRIVATE

## 2021-04-04 RX ADMIN — SODIUM CHLORIDE, PRESERVATIVE FREE 10 ML: 5 INJECTION INTRAVENOUS at 20:54

## 2021-04-04 RX ADMIN — FERROUS SULFATE TAB 325 MG (65 MG ELEMENTAL FE) 325 MG: 325 (65 FE) TAB at 08:49

## 2021-04-04 RX ADMIN — SODIUM CHLORIDE, PRESERVATIVE FREE 10 ML: 5 INJECTION INTRAVENOUS at 08:48

## 2021-04-04 RX ADMIN — ENOXAPARIN SODIUM 30 MG: 30 INJECTION SUBCUTANEOUS at 08:49

## 2021-04-04 RX ADMIN — REMDESIVIR 100 MG: 100 INJECTION, POWDER, LYOPHILIZED, FOR SOLUTION INTRAVENOUS at 06:16

## 2021-04-04 RX ADMIN — ENOXAPARIN SODIUM 30 MG: 30 INJECTION SUBCUTANEOUS at 20:54

## 2021-04-04 RX ADMIN — DEXAMETHASONE SODIUM PHOSPHATE 6 MG: 10 INJECTION, SOLUTION INTRAMUSCULAR; INTRAVENOUS at 08:49

## 2021-04-04 ASSESSMENT — PAIN SCALES - GENERAL
PAINLEVEL_OUTOF10: 0

## 2021-04-04 NOTE — PROGRESS NOTES
Hospitalist Progress Note      Name:  Ralph Dennis /Age/Sex: 2000  (24 y.o. female)   MRN & CSN:  [id number] & [id number] Admission Date/Time: 2021 10:16 PM   Location:  -A PCP: Jorge Luis Guevara MD         Hospital Day: 4    Assessment and Plan:   Ralph Dennis is a 24 y.o.  female  who presents with shortness of breath. She tested positive for COVID-19 on 3/30/2021.    -Acute hypoxic respiratory failure secondary to COVID-19 pneumonia  Infection confirmed on 3/30/2021. CTA chest-no PE. Bilateral groundglass opacities. Oxygen requirement-1 L/min via nasal cannula  Procalcitonin 0.1. Plan:  Continue dexamethasone-started 2021. Convalescent plasma-given 2021. Remdesivir-started 2021. Wean oxygen as tolerated  If everything remains stable, will discharge her tomorrow. -Iron deficiency anemia  Iron 12, transferrin 3%, TIBC 392  Plan: Continue oral iron replacement.    -Leukopenia due to COVID-19 infection  -Mild hyponatremia: Resolved with fluid resuscitation. Diet DIET GENERAL;   DVT Prophylaxis [x] Lovenox, []  Heparin, [] SCDs, [] Ambulation   GI Prophylaxis [] PPI,  [] H2 Blocker,  [] Carafate,  [] Diet/Tube Feeds   Code Status Full Code   Disposition  Home by tomorrow. MDM [] Low, [x] Moderate,[]  High     History of Present Illness:   Patient seen and examined. She reports feeling much better today. She denies shortness of breath at rest or with exertion. Ten point ROS reviewed negative, unless as noted above    Objective: Intake/Output Summary (Last 24 hours) at 2021 0921  Last data filed at 2021 2435  Gross per 24 hour   Intake 600 ml   Output --   Net 600 ml      Vitals:   Vitals:    21 0847   BP: (!) 97/57   Pulse: 83   Resp: 16   Temp: 97.7 °F (36.5 °C)   SpO2: 91%     Physical Exam:   GEN Awake female, sitting upright in bed. RESP breathing comfortably 1 L/min oxygen with saturation of 96%.     CARDIO/VASC S1/S2 auscultated. Regular tachycardia. No peripheral edema. GI Abdomen is soft without significant tenderness, masses, or guarding. Bowel sounds are normoactive. MSK No gross joint deformities. SKIN Normal coloration, warm, dry. NEURO normal speech, no lateralizing weakness.   PSYCH Awake, alert, oriented x 3     Medications:   Medications:    sodium chloride flush  10 mL Intravenous 2 times per day    enoxaparin  30 mg Subcutaneous BID    Vitamin D  2,000 Units Oral Daily    ferrous sulfate  325 mg Oral BID WC    remdesivir IVPB  100 mg Intravenous Q24H    dexamethasone  6 mg Intravenous Daily      Infusions:    sodium chloride      sodium chloride       PRN Meds: sodium chloride flush, 10 mL, PRN  sodium chloride, 25 mL, PRN  promethazine, 12.5 mg, Q6H PRN    Or  ondansetron, 4 mg, Q6H PRN  polyethylene glycol, 17 g, Daily PRN  acetaminophen, 650 mg, Q6H PRN    Or  acetaminophen, 650 mg, Q6H PRN  sodium chloride, , PRN  sodium chloride, 30 mL, PRN        CBC   Recent Labs     04/02/21  0630 04/03/21  0558 04/04/21  0600   WBC 2.0* 5.2 3.2*   HGB 8.8* 10.2* 9.0*   HCT 29.9* 31.0* 30.7*    310 314      BMP   Recent Labs     04/02/21  0630 04/03/21  0558 04/04/21  0600   * 135 138   K 4.0 3.9 4.0    99 104   CO2 23 23 25   BUN 4* 8 11   CREATININE 0.6 0.7 0.6       Radiology report reviewed    Electronically signed by Piotr Harper MD on 4/4/2021 at 9:21 AM

## 2021-04-05 VITALS
HEART RATE: 85 BPM | BODY MASS INDEX: 37.89 KG/M2 | RESPIRATION RATE: 20 BRPM | TEMPERATURE: 98.9 F | DIASTOLIC BLOOD PRESSURE: 78 MMHG | SYSTOLIC BLOOD PRESSURE: 122 MMHG | OXYGEN SATURATION: 95 % | WEIGHT: 250 LBS | HEIGHT: 68 IN

## 2021-04-05 LAB
ALBUMIN SERPL-MCNC: 3.8 GM/DL (ref 3.4–5)
ALP BLD-CCNC: 68 IU/L (ref 40–129)
ALT SERPL-CCNC: 12 U/L (ref 10–40)
ANION GAP SERPL CALCULATED.3IONS-SCNC: 10 MMOL/L (ref 4–16)
AST SERPL-CCNC: 13 IU/L (ref 15–37)
BASOPHILS ABSOLUTE: 0 K/CU MM
BASOPHILS RELATIVE PERCENT: 0 % (ref 0–1)
BILIRUB SERPL-MCNC: 0.2 MG/DL (ref 0–1)
BILIRUBIN DIRECT: 0.2 MG/DL (ref 0–0.3)
BILIRUBIN, INDIRECT: 0 MG/DL (ref 0–0.7)
BUN BLDV-MCNC: 11 MG/DL (ref 6–23)
CALCIUM SERPL-MCNC: 8.8 MG/DL (ref 8.3–10.6)
CHLORIDE BLD-SCNC: 105 MMOL/L (ref 99–110)
CO2: 25 MMOL/L (ref 21–32)
CREAT SERPL-MCNC: 0.6 MG/DL (ref 0.6–1.1)
DIFFERENTIAL TYPE: ABNORMAL
EOSINOPHILS ABSOLUTE: 0 K/CU MM
EOSINOPHILS RELATIVE PERCENT: 0 % (ref 0–3)
GFR AFRICAN AMERICAN: >60 ML/MIN/1.73M2
GFR NON-AFRICAN AMERICAN: >60 ML/MIN/1.73M2
GLUCOSE BLD-MCNC: 98 MG/DL (ref 70–99)
HCT VFR BLD CALC: 30.6 % (ref 37–47)
HEMOGLOBIN: 9.1 GM/DL (ref 12.5–16)
IMMATURE NEUTROPHIL %: 0.2 % (ref 0–0.43)
LEGIONELLA URINARY AG: NEGATIVE
LYMPHOCYTES ABSOLUTE: 1.9 K/CU MM
LYMPHOCYTES RELATIVE PERCENT: 37.3 % (ref 25–45)
MCH RBC QN AUTO: 23.3 PG (ref 27–31)
MCHC RBC AUTO-ENTMCNC: 29.7 % (ref 32–36)
MCV RBC AUTO: 78.5 FL (ref 78–100)
MONOCYTES ABSOLUTE: 0.3 K/CU MM
MONOCYTES RELATIVE PERCENT: 6.2 % (ref 0–4)
NUCLEATED RBC %: 0 %
PDW BLD-RTO: 13.7 % (ref 11.7–14.9)
PLATELET # BLD: 358 K/CU MM (ref 140–440)
PMV BLD AUTO: 9.4 FL (ref 7.5–11.1)
POTASSIUM SERPL-SCNC: 4 MMOL/L (ref 3.5–5.1)
RBC # BLD: 3.9 M/CU MM (ref 4.2–5.4)
SEGMENTED NEUTROPHILS ABSOLUTE COUNT: 2.9 K/CU MM
SEGMENTED NEUTROPHILS RELATIVE PERCENT: 56.3 % (ref 34–64)
SODIUM BLD-SCNC: 140 MMOL/L (ref 135–145)
STREP PNEUMONIAE ANTIGEN: NORMAL
TOTAL IMMATURE NEUTOROPHIL: 0.01 K/CU MM
TOTAL NUCLEATED RBC: 0 K/CU MM
TOTAL PROTEIN: 7 GM/DL (ref 6.4–8.2)
WBC # BLD: 5.2 K/CU MM (ref 4–10.5)

## 2021-04-05 PROCEDURE — 6360000002 HC RX W HCPCS: Performed by: PHYSICIAN ASSISTANT

## 2021-04-05 PROCEDURE — 2580000003 HC RX 258: Performed by: INTERNAL MEDICINE

## 2021-04-05 PROCEDURE — 80053 COMPREHEN METABOLIC PANEL: CPT

## 2021-04-05 PROCEDURE — 82248 BILIRUBIN DIRECT: CPT

## 2021-04-05 PROCEDURE — 6360000002 HC RX W HCPCS: Performed by: INTERNAL MEDICINE

## 2021-04-05 PROCEDURE — 85025 COMPLETE CBC W/AUTO DIFF WBC: CPT

## 2021-04-05 PROCEDURE — 2500000003 HC RX 250 WO HCPCS: Performed by: INTERNAL MEDICINE

## 2021-04-05 RX ORDER — ERGOCALCIFEROL (VITAMIN D2) 200 MCG/ML
2000 DROPS ORAL DAILY
Status: DISCONTINUED | OUTPATIENT
Start: 2021-04-05 | End: 2021-04-05 | Stop reason: HOSPADM

## 2021-04-05 RX ORDER — DEXAMETHASONE 6 MG/1
6 TABLET ORAL
Qty: 6 TABLET | Refills: 0 | Status: SHIPPED | OUTPATIENT
Start: 2021-04-06 | End: 2021-04-12

## 2021-04-05 RX ORDER — FERROUS SULFATE 300 MG/5ML
300 LIQUID (ML) ORAL 2 TIMES DAILY WITH MEALS
Status: DISCONTINUED | OUTPATIENT
Start: 2021-04-05 | End: 2021-04-05 | Stop reason: HOSPADM

## 2021-04-05 RX ORDER — FERROUS SULFATE 325(65) MG
325 TABLET ORAL 2 TIMES DAILY WITH MEALS
Qty: 30 TABLET | Refills: 3 | Status: SHIPPED | OUTPATIENT
Start: 2021-04-05

## 2021-04-05 RX ADMIN — SODIUM CHLORIDE, PRESERVATIVE FREE 10 ML: 5 INJECTION INTRAVENOUS at 09:31

## 2021-04-05 RX ADMIN — DEXAMETHASONE SODIUM PHOSPHATE 6 MG: 10 INJECTION, SOLUTION INTRAMUSCULAR; INTRAVENOUS at 09:31

## 2021-04-05 RX ADMIN — REMDESIVIR 100 MG: 100 INJECTION, POWDER, LYOPHILIZED, FOR SOLUTION INTRAVENOUS at 05:53

## 2021-04-05 RX ADMIN — ENOXAPARIN SODIUM 30 MG: 30 INJECTION SUBCUTANEOUS at 09:30

## 2021-04-05 ASSESSMENT — PAIN SCALES - GENERAL: PAINLEVEL_OUTOF10: 0

## 2021-04-05 NOTE — PROGRESS NOTES
Discharge instructions discussed with patient, paperwork signed and pt taken out front entrance where she was met by her mother. No concerns voiced.

## 2021-04-05 NOTE — DISCHARGE SUMMARY
Discharge Summary    Name:  Елена Salas /Age/Sex: 2000  (24 y.o. female)   MRN & CSN:  [id number] & [id number] Admission Date/Time: 2021 10:16 PM   Attending:  Shahram rOdoñez MD Discharging Physician: Shahram Ordoñez MD     Hospital Course:   Елена Salas is a 24 y.o.  female  who presents with shortness of breath. She tested positive for COVID-19 on 3/30/2021.     -Acute hypoxic respiratory failure secondary to COVID-19 pneumonia - improving at discharge. Infection confirmed on 3/30/2021. CTA chest-no PE. Bilateral groundglass opacities. Procalcitonin 0.1. She was managed with decadron, convalescent plasma and remdesivir. She improved and oxygen requirement decreased from 2 LPM to RA. She denies shortness of breath at rest and with ambulation and feels ready to go home today. She will complete Decadron course at home.     -Iron deficiency anemia  Iron 12, transferrin 3%, TIBC 392, ferritin 29  Oral iron replacement prescribed.     -Leukopenia due to COVID-19 infection  -Mild hyponatremia: Resolved with fluid resuscitation. The patient expressed appropriate understanding of and agreement with the discharge recommendations, medications, and plan.      Consults this admission:  IP CONSULT TO HOSPITALIST  IP CONSULT TO PHARMACY  IP CONSULT TO PHARMACY    Discharge Instruction:   Follow up appointments:  Primary care physician:  within 1-2 weeks for virtual visit    Diet:  regular diet   Activity: activity as tolerated  Disposition: Discharged to:   [x]Home, []C, []SNF, []Acute Rehab, []Hospice   Condition on discharge: Stable    Discharge Medications:      Nona Paulper   Home Medication Instructions ANGELIQUE:[id number]    Printed on:21 3911   Medication Information                      dexamethasone (DECADRON) 6 MG tablet  Take 1 tablet by mouth daily (with breakfast) for 6 days             ferrous sulfate (IRON 325) 325 (65 Fe) MG tablet  Take 1 tablet by mouth 2 times daily (with meals)             medroxyPROGESTERone (DEPO-PROVERA) 150 MG/ML injection  Inject 1 mL into the muscle once for 1 dose             polyethylene glycol (GLYCOLAX) packet  TAKE 1 PACKET (17 G) BY MOUTH DAILY, MIX WITH WATER                 Objective Findings at Discharge:   BP (!) 108/47   Pulse 63   Temp 97.7 °F (36.5 °C) (Oral)   Resp 16   Ht 5' 8\" (1.727 m)   Wt 250 lb (113.4 kg)   SpO2 93%   BMI 38.01 kg/m²            PHYSICAL EXAM   GEN    Awake female, sitting upright in bed. RESP  breathing comfortably RA   CARDIO/VASC           S1/S2 auscultated. Regular tachycardia. No peripheral edema. GI        Abdomen is soft without significant tenderness, masses, or guarding. Bowel sounds are normoactive. MSK    No gross joint deformities. SKIN    Normal coloration, warm, dry. NEURO           normal speech, no lateralizing weakness. PSYCH            Awake, alert, oriented x 3     BMP/CBC  Recent Labs     04/03/21  0558 04/04/21  0600 04/05/21  0530    138 140   K 3.9 4.0 4.0   CL 99 104 105   CO2 23 25 25   BUN 8 11 11   CREATININE 0.7 0.6 0.6   WBC 5.2 3.2* 5.2   HCT 31.0* 30.7* 30.6*    314 358       IMAGING:  CT chest with contrast  1. No evidence of pulmonary embolism   2.  Scattered ground-glass opacities and areas of consolidation, consistent   with COVID 19 pulmonary disease given the clinical history.             Discharge Time of 35 minutes    Electronically signed by Astrid Solorio MD on 4/5/2021 at 9:21 AM

## 2021-04-06 LAB
CULTURE: NORMAL
CULTURE: NORMAL
EKG ATRIAL RATE: 128 BPM
EKG DIAGNOSIS: NORMAL
EKG P AXIS: 16 DEGREES
EKG P-R INTERVAL: 142 MS
EKG Q-T INTERVAL: 304 MS
EKG QRS DURATION: 74 MS
EKG QTC CALCULATION (BAZETT): 443 MS
EKG R AXIS: 45 DEGREES
EKG T AXIS: 2 DEGREES
EKG VENTRICULAR RATE: 128 BPM
Lab: NORMAL
Lab: NORMAL
SPECIMEN: NORMAL
SPECIMEN: NORMAL

## 2021-07-29 ENCOUNTER — APPOINTMENT (OUTPATIENT)
Dept: CT IMAGING | Age: 21
End: 2021-07-29
Payer: COMMERCIAL

## 2021-07-29 ENCOUNTER — APPOINTMENT (OUTPATIENT)
Dept: GENERAL RADIOLOGY | Age: 21
End: 2021-07-29
Payer: COMMERCIAL

## 2021-07-29 ENCOUNTER — HOSPITAL ENCOUNTER (EMERGENCY)
Age: 21
Discharge: HOME | End: 2021-07-30
Attending: STUDENT IN AN ORGANIZED HEALTH CARE EDUCATION/TRAINING PROGRAM
Payer: COMMERCIAL

## 2021-07-29 DIAGNOSIS — U07.1 COVID-19: ICD-10-CM

## 2021-07-29 DIAGNOSIS — R06.00 DYSPNEA, UNSPECIFIED TYPE: Primary | ICD-10-CM

## 2021-07-29 LAB
ANION GAP SERPL CALCULATED.3IONS-SCNC: 15 MMOL/L (ref 4–16)
ANISOCYTOSIS: ABNORMAL
ATYPICAL LYMPHOCYTE ABSOLUTE COUNT: ABNORMAL
BUN BLDV-MCNC: 4 MG/DL (ref 6–23)
CALCIUM SERPL-MCNC: 8.8 MG/DL (ref 8.3–10.6)
CHLORIDE BLD-SCNC: 96 MMOL/L (ref 99–110)
CO2: 20 MMOL/L (ref 21–32)
CREAT SERPL-MCNC: 0.6 MG/DL (ref 0.6–1.1)
D DIMER: 1413 NG/ML(DDU)
DIFFERENTIAL TYPE: ABNORMAL
ELLIPTOCYTES: ABNORMAL
EOSINOPHILS ABSOLUTE: 0.1 K/CU MM
EOSINOPHILS RELATIVE PERCENT: 1 % (ref 0–3)
GFR AFRICAN AMERICAN: >60 ML/MIN/1.73M2
GFR NON-AFRICAN AMERICAN: >60 ML/MIN/1.73M2
GLUCOSE BLD-MCNC: 120 MG/DL (ref 70–99)
HCT VFR BLD CALC: 29.5 % (ref 37–47)
HEMOGLOBIN: 8.5 GM/DL (ref 12.5–16)
HYPOCHROMIA: ABNORMAL
LYMPHOCYTES ABSOLUTE: 3.5 K/CU MM
LYMPHOCYTES RELATIVE PERCENT: 63 % (ref 24–44)
MAGNESIUM: 1.8 MG/DL (ref 1.8–2.4)
MCH RBC QN AUTO: 20.8 PG (ref 27–31)
MCHC RBC AUTO-ENTMCNC: 28.8 % (ref 32–36)
MCV RBC AUTO: 72.3 FL (ref 78–100)
OVALOCYTES: ABNORMAL
PDW BLD-RTO: 17.1 % (ref 11.7–14.9)
PLATELET # BLD: 267 K/CU MM (ref 140–440)
PMV BLD AUTO: 9.2 FL (ref 7.5–11.1)
POLYCHROMASIA: ABNORMAL
POTASSIUM SERPL-SCNC: 3.2 MMOL/L (ref 3.5–5.1)
PRO-BNP: 13.17 PG/ML
RBC # BLD: 4.08 M/CU MM (ref 4.2–5.4)
SEGMENTED NEUTROPHILS ABSOLUTE COUNT: 2.1 K/CU MM
SEGMENTED NEUTROPHILS RELATIVE PERCENT: 36 % (ref 36–66)
SODIUM BLD-SCNC: 131 MMOL/L (ref 135–145)
TROPONIN T: <0.01 NG/ML
WBC # BLD: 5.7 K/CU MM (ref 4–10.5)

## 2021-07-29 PROCEDURE — 71275 CT ANGIOGRAPHY CHEST: CPT

## 2021-07-29 PROCEDURE — 71045 X-RAY EXAM CHEST 1 VIEW: CPT

## 2021-07-29 PROCEDURE — 80048 BASIC METABOLIC PNL TOTAL CA: CPT

## 2021-07-29 PROCEDURE — 84484 ASSAY OF TROPONIN QUANT: CPT

## 2021-07-29 PROCEDURE — 85027 COMPLETE CBC AUTOMATED: CPT

## 2021-07-29 PROCEDURE — 85379 FIBRIN DEGRADATION QUANT: CPT

## 2021-07-29 PROCEDURE — 6360000002 HC RX W HCPCS: Performed by: STUDENT IN AN ORGANIZED HEALTH CARE EDUCATION/TRAINING PROGRAM

## 2021-07-29 PROCEDURE — 93005 ELECTROCARDIOGRAM TRACING: CPT | Performed by: STUDENT IN AN ORGANIZED HEALTH CARE EDUCATION/TRAINING PROGRAM

## 2021-07-29 PROCEDURE — 94640 AIRWAY INHALATION TREATMENT: CPT

## 2021-07-29 PROCEDURE — 83735 ASSAY OF MAGNESIUM: CPT

## 2021-07-29 PROCEDURE — 99284 EMERGENCY DEPT VISIT MOD MDM: CPT

## 2021-07-29 PROCEDURE — 83880 ASSAY OF NATRIURETIC PEPTIDE: CPT

## 2021-07-29 PROCEDURE — 85007 BL SMEAR W/DIFF WBC COUNT: CPT

## 2021-07-29 PROCEDURE — 6360000004 HC RX CONTRAST MEDICATION: Performed by: STUDENT IN AN ORGANIZED HEALTH CARE EDUCATION/TRAINING PROGRAM

## 2021-07-29 RX ORDER — ALBUTEROL SULFATE 2.5 MG/3ML
2.5 SOLUTION RESPIRATORY (INHALATION) ONCE
Status: COMPLETED | OUTPATIENT
Start: 2021-07-29 | End: 2021-07-29

## 2021-07-29 RX ORDER — ALBUTEROL SULFATE 90 UG/1
2 AEROSOL, METERED RESPIRATORY (INHALATION) 4 TIMES DAILY PRN
Qty: 3 INHALER | Refills: 1 | Status: SHIPPED | OUTPATIENT
Start: 2021-07-29 | End: 2022-07-25 | Stop reason: ALTCHOICE

## 2021-07-29 RX ADMIN — ALBUTEROL SULFATE 2.5 MG: 2.5 SOLUTION RESPIRATORY (INHALATION) at 21:17

## 2021-07-29 RX ADMIN — IOPAMIDOL 75 ML: 755 INJECTION, SOLUTION INTRAVENOUS at 22:25

## 2021-07-29 ASSESSMENT — PAIN DESCRIPTION - PAIN TYPE: TYPE: ACUTE PAIN

## 2021-07-29 ASSESSMENT — PAIN SCALES - GENERAL: PAINLEVEL_OUTOF10: 5

## 2021-07-29 ASSESSMENT — PAIN DESCRIPTION - LOCATION: LOCATION: THROAT

## 2021-07-30 VITALS
OXYGEN SATURATION: 98 % | SYSTOLIC BLOOD PRESSURE: 111 MMHG | WEIGHT: 250 LBS | RESPIRATION RATE: 15 BRPM | HEIGHT: 68 IN | TEMPERATURE: 98.4 F | BODY MASS INDEX: 37.89 KG/M2 | DIASTOLIC BLOOD PRESSURE: 58 MMHG | HEART RATE: 107 BPM

## 2021-07-30 LAB
EKG ATRIAL RATE: 128 BPM
EKG DIAGNOSIS: NORMAL
EKG P AXIS: 17 DEGREES
EKG P-R INTERVAL: 144 MS
EKG Q-T INTERVAL: 312 MS
EKG QRS DURATION: 78 MS
EKG QTC CALCULATION (BAZETT): 455 MS
EKG R AXIS: 48 DEGREES
EKG T AXIS: -3 DEGREES
EKG VENTRICULAR RATE: 128 BPM

## 2021-07-30 PROCEDURE — 93010 ELECTROCARDIOGRAM REPORT: CPT | Performed by: INTERNAL MEDICINE

## 2021-07-30 NOTE — ED NOTES
Reviewed AVS with patient who verbalized understanding. Removed IV catheter with tubing intact. Discharged patient.      Jesika De Souza RN  07/30/21 [id number]

## 2021-07-30 NOTE — ED PROVIDER NOTES
[facility] COMPLAINT    Chief Complaint   Patient presents with    Cough    Shortness of Breath       HPI    Brenda Arce is a 22 y.o. female with history significant for Covid infections back in March and April require hospitalization who presents with cough shortness of breath. Patient continues to have cough and shortness of breath since infection backing March, still complaining by exertional components of the dyspnea, to be ongoing for 2 weeks slowly getting worse, denies any chest pain nausea vomiting diarrhea, denies any fever cough has been dry. Denies any leg swelling or prolonged mobilization or any recent surgery. Patient was previously taking oral contraceptive but now has stopped. No personal family history of DVT or PE       REVIEW OF SYSTEMS    Constitutional: Denies chills, fatigue, unexpected weight loss or fever. HENT: Denies sore throat or rhinorrhea. Eyes: Denies vision changes. Respiratory: shortness of breath or cough. Cardiovascular: Denies chest pain, leg swelling or palpitations. Gastrointestinal: Denies abdominal pain, diarrhea, nausea and vomiting. Genitourinary: Denies dysuria or hematuria. Skin: Denies rashes or wounds. MSK: Denies joint pain. Neurologic: Denies headache, lightheadedness, numbness, or weakness. Hematologic/lymphatic: Denies unexpected weight loss, night sweats  Endocrine: No polyuria, polydipsia, or polyphagia      Pertinent positives and negatives are delineated in HPI and ROS above, all other systems are reviewed and are negative    PAST MEDICAL HISTORY    History reviewed. No pertinent past medical history. Medical history reviewed and no pertinent past medical history other than the ones mentioned above    SURGICAL HISTORY    History reviewed. No pertinent surgical history.   Surgical history reviewed and no pertinent surgical history other than the ones mentioned above    CURRENT MEDICATIONS Current Outpatient Rx   Medication Sig Dispense Refill    albuterol sulfate HFA (VENTOLIN HFA) 108 (90 Base) MCG/ACT inhaler Inhale 2 puffs into the lungs 4 times daily as needed for Wheezing 3 Inhaler 1    Spacer/Aero-Hold Chamber Bags MISC 1 Bag by Does not apply route 4 times daily 1 each 0    ferrous sulfate (IRON 325) 325 (65 Fe) MG tablet Take 1 tablet by mouth 2 times daily (with meals) 30 tablet 3    medroxyPROGESTERone (DEPO-PROVERA) 150 MG/ML injection Inject 1 mL into the muscle once for 1 dose 1 mL 3    polyethylene glycol (GLYCOLAX) packet TAKE 1 PACKET (17 G) BY MOUTH DAILY, MIX WITH WATER (Patient taking differently: Take 17 g by mouth daily as needed TAKE 1 PACKET (17 G) BY MOUTH DAILY, MIX WITH WATER) 30 each 3     Medication is reviewed    ALLERGIES    No Known Allergies  Allergy is reviewed    FAMILY HISTORY    History reviewed. No pertinent family history. Family history reviewed and no pertinent family history other than the ones mentioned above    SOCIAL HISTORY    Social History     Socioeconomic History    Marital status: Single     Spouse name: Not on file    Number of children: Not on file    Years of education: Not on file    Highest education level: Not on file   Occupational History    Not on file   Tobacco Use    Smoking status: Never Smoker    Smokeless tobacco: Never Used   Vaping Use    Vaping Use: Never used   Substance and Sexual Activity    Alcohol use: No    Drug use: No    Sexual activity: Never   Other Topics Concern    Not on file   Social History Narrative    ** Merged History Encounter **          Social Determinants of Health     Financial Resource Strain:     Difficulty of Paying Living Expenses:    Food Insecurity:     Worried About Running Out of Food in the Last Year:     [address] in the Last Year:    Transportation Needs:     Lack of Transportation (Medical):      Lack of Transportation (Non-Medical):    Physical Activity:     Days of Exercise per Week:     Minutes of Exercise per Session:    Stress:     Feeling of Stress :    Social Connections:     Frequency of Communication with Friends and Family:     Frequency of Social Gatherings with Friends and Family:     Attends Baptism Services:     Active Member of Clubs or Organizations:     Attends Club or Organization Meetings:     Marital Status:    Intimate Partner Violence:     Fear of Current or Ex-Partner:     Emotionally Abused:     Physically Abused:     Sexually Abused:      Live with mom  Alcohol and recreational drug use: Denies  Social history reviewed and no pertinent social history other than the ones mentioned above    PHYSICAL EXAM    Vital Signs:BP (!) 111/58   Pulse 107   Temp 98.4 °F (36.9 °C)   Resp 15   Ht 5' 8\" (1.727 m)   Wt 250 lb (113.4 kg)   SpO2 98%   BMI 38.01 kg/m²   I have reviewed the triage vital signs. Constitutional: Well nourished, well developed, appears stated age  Eyes: PERRL, no conjunctival injection  HENT: NCAT, Neck supple without meningismus   CV: RRR, Warm, no edema no chest wall tenderness, no murmurs or friction rub  RESP: Normal RR, no increased respiratory efforts, no wheezes, moving air well  GI: soft, non-distended  MSK: No gross deformities  Skin: Warm, dry. No rashes  Neuro: Alert, CNs II-XII grossly intact. Moving all 4 extremities  Psych: Appropriate mood and affect.     Labs:   Labs Reviewed   CBC WITH AUTO DIFFERENTIAL - Abnormal; Notable for the following components:       Result Value    RBC 4.08 (*)     Hemoglobin 8.5 (*)     Hematocrit 29.5 (*)     MCV 72.3 (*)     MCH 20.8 (*)     MCHC 28.8 (*)     RDW 17.1 (*)     Lymphocytes % 63.0 (*)     All other components within normal limits   BASIC METABOLIC PANEL W/ REFLEX TO MG FOR LOW K - Abnormal; Notable for the following components:    Sodium 131 (*)     Potassium 3.2 (*)     Chloride 96 (*)     CO2 20 (*)     BUN 4 (*)     Glucose 120 (*)     All other components within normal limits   D-DIMER, QUANTITATIVE - Abnormal; Notable for the following components:    D-Dimer, Quant 1413 (*)     All other components within normal limits   TROPONIN   BRAIN NATRIURETIC PEPTIDE   MAGNESIUM       Radiology:  CTA PULMONARY W CONTRAST   Preliminary Result   No evidence of pulmonary embolism. No acute pulmonary abnormality. Hepatic steatosis. Resolution of previous seen pneumonia. Linear opacities in both lung bases   suggesting scarring from prior infection. XR CHEST PORTABLE   Final Result   Bilateral lower lobe airspace opacities which are similar in appearance to   the prior study. These could represent infiltrates or scarring. Follow up   to resolution is suggested. I directly reviewed the images and radiology interpretation    EKG interpreted by myself: It is tachycardia with no significant ST-T wave changes. P waves visible    ED COURSE  Assessment & Medical Decision Making:  Marty Millan is a 22 y.o. female who presents with cough shortness of breath for 2 weeks with Covid infection back in March, patient does have increased risk of bacterial superinfection versus microthrombi O embolic events in the setting of Covid disease. We did initially obtain chest x-ray that showed scarring, D-dimer was elevated patient required a CT angiogram that did not show any pulmonary embolism which is reassuring silken did show some scarring in the lung bases which is consistent with patient's Covid disease however no superimposed pneumonia. I did perform a bedside ambulation test on patient and patient's oxygen saturation persisted between 97% to 100% with no increased work of breathing, patient was given a dose of albuterol with improved symptoms, patient will be discharged with albuterol and PCP follow-up.           DDx includes but not limited to:  PNA, PE, HF exacerbation, volume overload from cirrhosis, volume overload from renal dysfunction, anemia, CNS, acidosis, ACS, anxiety, PTX, pleural effusion, bronchiectasis, airway obstruction, reactive airway disease exacerbation (asthma/COPD/WARI), anaphylaxis/anaphylactoid reaction/allergic reaction      Workup includes but not limited to: Dimer, PE study, cardiac work-up, x-ray    Treatment includes but not limited to: Breathing treatment    Critical care time: NA    Impression:   1. Shortness of breath    Disposition: Discharge    This note dictated using Dragon medical voice recognition software. Attempts at proofreading were made, but errors may occasionally still occur.           Lyndsey Dhaliwal MD  07/30/21 Darren Verdugo

## 2021-11-02 ENCOUNTER — OFFICE VISIT (OUTPATIENT)
Dept: OBGYN | Age: 21
End: 2021-11-02
Payer: COMMERCIAL

## 2021-11-02 VITALS
BODY MASS INDEX: 40.01 KG/M2 | DIASTOLIC BLOOD PRESSURE: 87 MMHG | HEIGHT: 68 IN | WEIGHT: 264 LBS | SYSTOLIC BLOOD PRESSURE: 139 MMHG

## 2021-11-02 DIAGNOSIS — R79.89 ELEVATED D-DIMER: Primary | ICD-10-CM

## 2021-11-02 DIAGNOSIS — N92.6 IRREGULAR MENSES: ICD-10-CM

## 2021-11-02 LAB
ANISOCYTOSIS: ABNORMAL
BASOPHILS ABSOLUTE: 0 K/UL (ref 0–0.2)
BASOPHILS RELATIVE PERCENT: 0.4 %
EOSINOPHILS ABSOLUTE: 0.1 K/UL (ref 0–0.6)
EOSINOPHILS RELATIVE PERCENT: 0.9 %
FOLLICLE STIMULATING HORMONE: 6.4 MIU/ML
HCG QUALITATIVE: NEGATIVE
HCT VFR BLD CALC: 33.1 % (ref 36–48)
HEMATOLOGY PATH CONSULT: YES
HEMOGLOBIN: 10.3 G/DL (ref 12–16)
IRON SATURATION: 5 % (ref 15–50)
IRON: 22 UG/DL (ref 37–145)
LYMPHOCYTES ABSOLUTE: 2.6 K/UL (ref 1–5.1)
LYMPHOCYTES RELATIVE PERCENT: 38.7 %
MCH RBC QN AUTO: 20.7 PG (ref 26–34)
MCHC RBC AUTO-ENTMCNC: 31.2 G/DL (ref 31–36)
MCV RBC AUTO: 66.4 FL (ref 80–100)
MONOCYTES ABSOLUTE: 0.4 K/UL (ref 0–1.3)
MONOCYTES RELATIVE PERCENT: 5.6 %
NEUTROPHILS ABSOLUTE: 3.7 K/UL (ref 1.7–7.7)
NEUTROPHILS RELATIVE PERCENT: 54.4 %
OVALOCYTES: ABNORMAL
PDW BLD-RTO: 18 % (ref 12.4–15.4)
PLATELET # BLD: 460 K/UL (ref 135–450)
PLATELET SLIDE REVIEW: ABNORMAL
PMV BLD AUTO: 8 FL (ref 5–10.5)
POIKILOCYTES: ABNORMAL
POLYCHROMASIA: ABNORMAL
PROLACTIN: 8 NG/ML
RBC # BLD: 4.98 M/UL (ref 4–5.2)
SLIDE REVIEW: ABNORMAL
TOTAL IRON BINDING CAPACITY: 454 UG/DL (ref 260–445)
TSH REFLEX FT4: 3.06 UIU/ML (ref 0.27–4.2)
WBC # BLD: 6.8 K/UL (ref 4–11)

## 2021-11-02 PROCEDURE — 36415 COLL VENOUS BLD VENIPUNCTURE: CPT | Performed by: OBSTETRICS & GYNECOLOGY

## 2021-11-02 PROCEDURE — 99204 OFFICE O/P NEW MOD 45 MIN: CPT | Performed by: OBSTETRICS & GYNECOLOGY

## 2021-11-02 RX ORDER — PNV NO.95/FERROUS FUM/FOLIC AC 28MG-0.8MG
1 TABLET ORAL DAILY
Qty: 30 TABLET | Refills: 11 | Status: SHIPPED | OUTPATIENT
Start: 2021-11-02 | End: 2022-07-25 | Stop reason: ALTCHOICE

## 2021-11-02 SDOH — ECONOMIC STABILITY: FOOD INSECURITY: WITHIN THE PAST 12 MONTHS, YOU WORRIED THAT YOUR FOOD WOULD RUN OUT BEFORE YOU GOT MONEY TO BUY MORE.: NEVER TRUE

## 2021-11-02 SDOH — ECONOMIC STABILITY: FOOD INSECURITY: WITHIN THE PAST 12 MONTHS, THE FOOD YOU BOUGHT JUST DIDN'T LAST AND YOU DIDN'T HAVE MONEY TO GET MORE.: NEVER TRUE

## 2021-11-02 ASSESSMENT — PATIENT HEALTH QUESTIONNAIRE - PHQ9
SUM OF ALL RESPONSES TO PHQ QUESTIONS 1-9: 0
SUM OF ALL RESPONSES TO PHQ9 QUESTIONS 1 & 2: 0
1. LITTLE INTEREST OR PLEASURE IN DOING THINGS: 0
SUM OF ALL RESPONSES TO PHQ QUESTIONS 1-9: 0
SUM OF ALL RESPONSES TO PHQ QUESTIONS 1-9: 0
2. FEELING DOWN, DEPRESSED OR HOPELESS: 0

## 2021-11-02 ASSESSMENT — ENCOUNTER SYMPTOMS
EYES NEGATIVE: 1
ALLERGIC/IMMUNOLOGIC NEGATIVE: 1
GASTROINTESTINAL NEGATIVE: 1
RESPIRATORY NEGATIVE: 1

## 2021-11-02 ASSESSMENT — SOCIAL DETERMINANTS OF HEALTH (SDOH): HOW HARD IS IT FOR YOU TO PAY FOR THE VERY BASICS LIKE FOOD, HOUSING, MEDICAL CARE, AND HEATING?: NOT HARD AT ALL

## 2021-11-02 NOTE — PROGRESS NOTES
22    Jessica Washington  2000    Chief Complaint   Patient presents with    Follow-up     Menorrhagia, here to discuss ultrasound, pt finished 3 months of ocp, LMP 10/17, pt states her cycles have been normal.         Jessica Washington is a 22 y.o. female who presents today for evaluation of AUB. Menses have improved while being on ocps. In interim patient with moderate - severe covid. Last d-dimer in July still severely elevated. She desires pregnancy. Need to ensure her lungs are improved prior to pregnancy. No shortness of breath. Can walk several flights of stairs. Normal menses on ocps, but prior to ocps heavy and irregular menses. Bright red.   + clots.  + fatigue which has improved while on ocps. No past medical history on file. No past surgical history on file. Social History     Tobacco Use    Smoking status: Never Smoker    Smokeless tobacco: Never Used   Vaping Use    Vaping Use: Never used   Substance Use Topics    Alcohol use: No    Drug use: No       No family history on file. Current Outpatient Medications   Medication Sig Dispense Refill    Prenatal Vit-Fe Fumarate-FA (PRENATAL VITAMINS) 28-0.8 MG TABS Take 1 tablet by mouth daily (any prenatal vitamin covered) 30 tablet 11    albuterol sulfate HFA (VENTOLIN HFA) 108 (90 Base) MCG/ACT inhaler Inhale 2 puffs into the lungs 4 times daily as needed for Wheezing 3 Inhaler 1    Spacer/Aero-Hold Chamber Bags MISC 1 Bag by Does not apply route 4 times daily 1 each 0    ferrous sulfate (IRON 325) 325 (65 Fe) MG tablet Take 1 tablet by mouth 2 times daily (with meals) 30 tablet 3    polyethylene glycol (GLYCOLAX) packet TAKE 1 PACKET (17 G) BY MOUTH DAILY, MIX WITH WATER (Patient taking differently: Take 17 g by mouth daily as needed TAKE 1 PACKET (17 G) BY MOUTH DAILY, MIX WITH WATER) 30 each 3     No current facility-administered medications for this visit.        No Known Allergies        Immunization History   Administered Date(s) Administered    DTaP 2001, 2001, 2001, 07/18/2002, 10/14/2005    HPV Quadrivalent (Gardasil) 06/30/2010, 08/31/2010, 04/11/2011    Hepatitis A 11/21/2007, 02/17/2009    Hepatitis B 2001, 2001, 2001    Hib, unspecified 2001, 2001, 2001, 04/18/2002    Influenza Nasal 11/21/2007, 11/04/2008, 10/15/2009    Influenza Virus Vaccine 10/26/2010, 10/04/2012, 10/26/2013, 10/03/2014, 09/25/2015    Influenza, [place], IM, (6 mo and older Fluzone, Flulaval, Fluarix and 3 yrs and older Afluria) 10/12/2016    MMR 04/18/2002, 10/14/2005    Meningococcal MCV4P (Menactra) 05/30/2013, 07/17/2018    PPD Test 03/12/2019    Polio IPV (IPOL) 2001, 2001, 04/18/2002, 10/14/2005    Rotavirus Pentavalent (RotaTeq) 2001, 2001, 04/18/2002    Tdap (Boostrix, Adacel) 05/30/2013    Varicella (Varivax) 11/21/2007       Review of Systems   Constitutional: Negative. HENT: Negative. Eyes: Negative. Respiratory: Negative. Cardiovascular: Negative. Gastrointestinal: Negative. Endocrine: Negative. Genitourinary: Positive for menstrual problem. Musculoskeletal: Negative. Skin: Negative. Allergic/Immunologic: Negative. Neurological: Negative. Hematological: Negative. Psychiatric/Behavioral: Negative. /87   Ht 5' 8\" (1.727 m)   Wt 264 lb (119.7 kg)   LMP 10/17/2021   BMI 40.14 kg/m²     Physical Exam  Constitutional:       General: She is not in acute distress. Appearance: Normal appearance. She is not ill-appearing. HENT:      Head: Normocephalic. Nose: Nose normal.   Eyes:      General: No scleral icterus. Right eye: No discharge. Left eye: No discharge. Cardiovascular:      Pulses: Normal pulses. Pulmonary:      Effort: Pulmonary effort is normal.   Abdominal:      General: Abdomen is flat. There is no distension. Palpations: Abdomen is soft. There is no mass. Tenderness: There is no abdominal tenderness. Hernia: No hernia is present. Musculoskeletal:         General: Normal range of motion. Cervical back: Normal range of motion and neck supple. No rigidity. Skin:     General: Skin is warm and dry. Neurological:      General: No focal deficit present. Mental Status: She is alert and oriented to person, place, and time. Psychiatric:         Mood and Affect: Mood normal.         Behavior: Behavior normal.       D-Dimer, Quant 1413High   <230 NG/mL(DDU) Final 07/29/2021  8:54 PM [facility]      See ct 7/29/21    See pelvic ultrasound 10/26/21  No results found for this visit on 11/02/21. ASSESSMENT AND PLAN   Diagnosis Orders   1. Elevated d-dimer  D-DIMER, QUANTITATIVE   2. Irregular menses  Iron and TIBC    CBC Auto Differential    Prolactin    TSH WITH REFLEX TO FT4    Testosterone, free, total    Follicle Stimulating Hormone    HCG Qualitative, Serum    Prenatal Vit-Fe Fumarate-FA (PRENATAL VITAMINS) 28-0.8 MG TABS     Discussed that we need to make sure her d-dimer is improved prior to her getting pregnant. If still elevated, will have patient see hematology or possibly a chronic covid clinic. Return in about 2 months (around 1/2/2022).     Bartolo Ahumada, MD

## 2021-11-03 ENCOUNTER — TELEPHONE (OUTPATIENT)
Dept: OBGYN | Age: 21
End: 2021-11-03

## 2021-11-03 LAB — HEMATOLOGY PATH CONSULT: NORMAL

## 2021-11-04 LAB
SEX HORMONE BINDING GLOBULIN: 78 NMOL/L (ref 30–135)
TESTOSTERONE FREE-NONMALE: 4.3 PG/ML (ref 0.8–7.4)
TESTOSTERONE TOTAL: 43 NG/DL (ref 20–70)

## 2022-01-06 ENCOUNTER — OFFICE VISIT (OUTPATIENT)
Dept: OBGYN | Age: 22
End: 2022-01-06
Payer: COMMERCIAL

## 2022-01-06 VITALS
DIASTOLIC BLOOD PRESSURE: 86 MMHG | WEIGHT: 264 LBS | SYSTOLIC BLOOD PRESSURE: 150 MMHG | HEIGHT: 68 IN | BODY MASS INDEX: 40.01 KG/M2

## 2022-01-06 DIAGNOSIS — D50.9 MICROCYTIC ANEMIA: ICD-10-CM

## 2022-01-06 DIAGNOSIS — N92.1 MENOMETRORRHAGIA: Primary | ICD-10-CM

## 2022-01-06 LAB
BASOPHILS ABSOLUTE: 0 K/UL (ref 0–0.2)
BASOPHILS RELATIVE PERCENT: 0.8 %
EOSINOPHILS ABSOLUTE: 0.2 K/UL (ref 0–0.6)
EOSINOPHILS RELATIVE PERCENT: 3.7 %
FERRITIN: 33.7 NG/ML (ref 15–150)
HCT VFR BLD CALC: 35.6 % (ref 36–48)
HEMOGLOBIN: 11.4 G/DL (ref 12–16)
IRON SATURATION: 7 % (ref 15–50)
IRON: 26 UG/DL (ref 37–145)
LYMPHOCYTES ABSOLUTE: 2.2 K/UL (ref 1–5.1)
LYMPHOCYTES RELATIVE PERCENT: 38.8 %
MCH RBC QN AUTO: 22.6 PG (ref 26–34)
MCHC RBC AUTO-ENTMCNC: 32 G/DL (ref 31–36)
MCV RBC AUTO: 70.8 FL (ref 80–100)
MONOCYTES ABSOLUTE: 0.5 K/UL (ref 0–1.3)
MONOCYTES RELATIVE PERCENT: 8.4 %
NEUTROPHILS ABSOLUTE: 2.7 K/UL (ref 1.7–7.7)
NEUTROPHILS RELATIVE PERCENT: 48.3 %
PDW BLD-RTO: 21.1 % (ref 12.4–15.4)
PLATELET # BLD: 351 K/UL (ref 135–450)
PMV BLD AUTO: 7.3 FL (ref 5–10.5)
RBC # BLD: 5.03 M/UL (ref 4–5.2)
TOTAL IRON BINDING CAPACITY: 400 UG/DL (ref 260–445)
WBC # BLD: 5.6 K/UL (ref 4–11)

## 2022-01-06 PROCEDURE — G8427 DOCREV CUR MEDS BY ELIG CLIN: HCPCS | Performed by: OBSTETRICS & GYNECOLOGY

## 2022-01-06 PROCEDURE — 1036F TOBACCO NON-USER: CPT | Performed by: OBSTETRICS & GYNECOLOGY

## 2022-01-06 PROCEDURE — G8484 FLU IMMUNIZE NO ADMIN: HCPCS | Performed by: OBSTETRICS & GYNECOLOGY

## 2022-01-06 PROCEDURE — G8417 CALC BMI ABV UP PARAM F/U: HCPCS | Performed by: OBSTETRICS & GYNECOLOGY

## 2022-01-06 PROCEDURE — 99214 OFFICE O/P EST MOD 30 MIN: CPT | Performed by: OBSTETRICS & GYNECOLOGY

## 2022-01-06 PROCEDURE — 36415 COLL VENOUS BLD VENIPUNCTURE: CPT | Performed by: OBSTETRICS & GYNECOLOGY

## 2022-01-06 RX ORDER — PNV NO.95/FERROUS FUM/FOLIC AC 28MG-0.8MG
1 TABLET ORAL DAILY
Qty: 30 TABLET | Refills: 11 | Status: SHIPPED | OUTPATIENT
Start: 2022-01-06 | End: 2022-07-25 | Stop reason: ALTCHOICE

## 2022-01-06 RX ORDER — MEDROXYPROGESTERONE ACETATE 10 MG/1
20 TABLET ORAL DAILY
Qty: 20 TABLET | Refills: 0 | Status: SHIPPED | OUTPATIENT
Start: 2022-01-06 | End: 2022-07-25 | Stop reason: ALTCHOICE

## 2022-01-06 ASSESSMENT — ENCOUNTER SYMPTOMS
ALLERGIC/IMMUNOLOGIC NEGATIVE: 1
GASTROINTESTINAL NEGATIVE: 1
RESPIRATORY NEGATIVE: 1
EYES NEGATIVE: 1

## 2022-01-06 NOTE — PROGRESS NOTES
1/6/22    Nuno Kilgore  2000    Chief Complaint   Patient presents with    Follow-up     pt here to f/u on menorrhagia, irregular menses. states last menses was  10/17/21-10/23/21. has not had menses since stopping ocp. Nuno Kilgore is a 22 y.o. female who presents today for evaluation of menometrorrhagia. Menses were extremley heavy and now no menses since stopped ocps. Past Medical History:   Diagnosis Date    Anemia     Irregular menses     Menorrhagia        History reviewed. No pertinent surgical history. Social History     Tobacco Use    Smoking status: Never Smoker    Smokeless tobacco: Never Used   Vaping Use    Vaping Use: Never used   Substance Use Topics    Alcohol use: No    Drug use: No       History reviewed. No pertinent family history. Current Outpatient Medications   Medication Sig Dispense Refill    medroxyPROGESTERone (PROVERA) 10 MG tablet Take 2 tablets by mouth daily for 10 days 20 tablet 0    Prenatal Vit-Fe Fumarate-FA (PRENATAL VITAMINS) 28-0.8 MG TABS Take 1 tablet by mouth daily (any prenatal vitamin covered) 30 tablet 11    albuterol sulfate HFA (VENTOLIN HFA) 108 (90 Base) MCG/ACT inhaler Inhale 2 puffs into the lungs 4 times daily as needed for Wheezing 3 Inhaler 1    ferrous sulfate (IRON 325) 325 (65 Fe) MG tablet Take 1 tablet by mouth 2 times daily (with meals) 30 tablet 3    Prenatal Vit-Fe Fumarate-FA (PRENATAL VITAMINS) 28-0.8 MG TABS Take 1 tablet by mouth daily (any prenatal vitamin covered) 30 tablet 11    Spacer/Aero-Hold Chamber Bags MISC 1 Bag by Does not apply route 4 times daily (Patient not taking: Reported on 1/6/2022) 1 each 0    polyethylene glycol (GLYCOLAX) packet TAKE 1 PACKET (17 G) BY MOUTH DAILY, MIX WITH WATER (Patient taking differently: Take 17 g by mouth daily as needed TAKE 1 PACKET (17 G) BY MOUTH DAILY, MIX WITH WATER) 30 each 3     No current facility-administered medications for this visit.        No Known Allergies        Immunization History   Administered Date(s) Administered    DTaP 2001, 2001, 2001, 2002, 10/14/2005    HPV Quadrivalent (Gardasil) 2010, 2010, 2011    Hepatitis A 2007, 2009    Hepatitis B 2001, 2001, 2001    Hib, unspecified 2001, 2001, 2001, 2002    Influenza Nasal 2007, 2008, 10/15/2009    Influenza Virus Vaccine 10/26/2010, 10/04/2012, 10/26/2013, 10/03/2014, 2015    Influenza, [place], IM, (6 mo and older Fluzone, Flulaval, Fluarix and 3 yrs and older Afluria) 10/12/2016    MMR 2002, 10/14/2005    Meningococcal MCV4P (Menactra) 2013, 2018    PPD Test 2019    Polio IPV (IPOL) 2001, 2001, 2002, 10/14/2005    Rotavirus Pentavalent (RotaTeq) 2001, 2001, 2002    Tdap (Boostrix, Adacel) 2013    Varicella (Varivax) 2007       Review of Systems   Constitutional: Negative. Eyes: Negative. Respiratory: Negative. Cardiovascular: Negative. Gastrointestinal: Negative. Endocrine: Negative. Genitourinary: Positive for menstrual problem. Musculoskeletal: Negative. Skin: Negative. Allergic/Immunologic: Negative. Neurological: Negative. Hematological: Negative. Psychiatric/Behavioral: Negative.         BP (!) 150/86   Ht 5' 8\" (1.727 m)   Wt 264 lb (119.7 kg)   LMP 10/17/2021   BMI 40.14 kg/m²     Physical Exam  Collected Updated Procedure    2021 0858 2021 1642 Blood Smear Review [[id number]]  Component Value   Path Consult Reviewed            2021 0858 2021 1720 HCG Qualitative, Serum [[id number]]    Blood    Component Value   hCG Qual Negative           2021 0858  9373 Follicle Stimulating Hormone [[id number]]    Blood    Component Value Units   FSH 6.4  mIU/mL           2021 0858 2021 1502 Testosterone, free, total [[id number]]    Blood    Component Value Units   Testosterone 43 ng/dL   Sex Hormone Binding 78 nmol/L   Testosterone, Free 4.3  pg/mL           11/02/2021 0858 11/02/2021 1753 TSH WITH REFLEX TO FT4 [[id number]]    Blood    Component Value Units   TSH Reflex FT4 3.06 uIU/mL           11/02/2021 0858 11/02/2021 1755 Prolactin [[id number]]    Blood    Component Value Units   Prolactin 8.0  ng/mL           11/02/2021 0858 11/03/2021 1641 CBC Auto Differential [[id number]]    (Abnormal)   Blood    Component Value Units   WBC 6.8 K/uL   RBC 4.98 M/uL   Hemoglobin 10.3 Low  g/dL   Hematocrit 33.1 Low  %   MCV 66.4 Low  fL   MCH 20.7 Low  pg   MCHC 31.2 g/dL   RDW 18.0 High  %   Platelets 164 SJNX  K/uL   MPV 8.0 fL   PLATELET SLIDE REVIEW Increased    SLIDE REVIEW see below     Path Consult Yes     Neutrophils % 54.4 %   Lymphocytes % 38.7 %   Monocytes % 5.6 %   Eosinophils % 0.9 %   Basophils % 0.4 %   Neutrophils Absolute 3.7 K/uL   Lymphocytes Absolute 2.6 K/uL   Monocytes Absolute 0.4 K/uL   Eosinophils Absolute 0.1 K/uL   Basophils Absolute 0.0 K/uL   Anisocytosis Occasional Abnormal     Polychromasia Occasional Abnormal     Poikilocytes 1+ Abnormal     Ovalocytes 1+ Abnormal             11/02/2021 0858 11/02/2021 1755 Iron and TIBC [[id number]]    (Abnormal)   Blood    Component Value Units   Iron 22 Low  ug/dL   TIBC 454 High  ug/dL   Iron Saturation 5 Low  %             See us report 10/26/21    No results found for this visit on 01/06/22. ASSESSMENT AND PLAN   Diagnosis Orders   1. Menometrorrhagia  CBC Auto Differential    Iron and TIBC    Ferritin    HCG, SERUM, QUALITATIVE    medroxyPROGESTERone (PROVERA) 10 MG tablet   2. Microcytic anemia  CBC Auto Differential    Iron and TIBC    Ferritin    HCG, SERUM, QUALITATIVE    medroxyPROGESTERone (PROVERA) 10 MG tablet       No follow-ups on file.     Christa Trujillo MD

## 2022-03-08 ENCOUNTER — OFFICE VISIT (OUTPATIENT)
Dept: OBGYN | Age: 22
End: 2022-03-08
Payer: COMMERCIAL

## 2022-03-08 VITALS
WEIGHT: 269 LBS | BODY MASS INDEX: 40.77 KG/M2 | DIASTOLIC BLOOD PRESSURE: 86 MMHG | HEIGHT: 68 IN | SYSTOLIC BLOOD PRESSURE: 131 MMHG

## 2022-03-08 DIAGNOSIS — E66.01 MORBID OBESITY (HCC): ICD-10-CM

## 2022-03-08 DIAGNOSIS — D50.0 IRON DEFICIENCY ANEMIA DUE TO CHRONIC BLOOD LOSS: ICD-10-CM

## 2022-03-08 DIAGNOSIS — N92.6 IRREGULAR MENSES: Primary | ICD-10-CM

## 2022-03-08 LAB
CONTROL: NORMAL
HCG QUALITATIVE: NEGATIVE
PREGNANCY TEST URINE, POC: NORMAL

## 2022-03-08 PROCEDURE — G8484 FLU IMMUNIZE NO ADMIN: HCPCS | Performed by: OBSTETRICS & GYNECOLOGY

## 2022-03-08 PROCEDURE — G8417 CALC BMI ABV UP PARAM F/U: HCPCS | Performed by: OBSTETRICS & GYNECOLOGY

## 2022-03-08 PROCEDURE — 1036F TOBACCO NON-USER: CPT | Performed by: OBSTETRICS & GYNECOLOGY

## 2022-03-08 PROCEDURE — 81025 URINE PREGNANCY TEST: CPT | Performed by: OBSTETRICS & GYNECOLOGY

## 2022-03-08 PROCEDURE — G8427 DOCREV CUR MEDS BY ELIG CLIN: HCPCS | Performed by: OBSTETRICS & GYNECOLOGY

## 2022-03-08 PROCEDURE — 36415 COLL VENOUS BLD VENIPUNCTURE: CPT | Performed by: OBSTETRICS & GYNECOLOGY

## 2022-03-08 PROCEDURE — 99214 OFFICE O/P EST MOD 30 MIN: CPT | Performed by: OBSTETRICS & GYNECOLOGY

## 2022-03-08 RX ORDER — LETROZOLE 2.5 MG/1
5 TABLET, FILM COATED ORAL DAILY
Qty: 10 TABLET | Refills: 5 | Status: SHIPPED | OUTPATIENT
Start: 2022-03-08

## 2022-03-08 RX ORDER — MEDROXYPROGESTERONE ACETATE 10 MG/1
20 TABLET ORAL DAILY
Qty: 20 TABLET | Refills: 0 | Status: SHIPPED | OUTPATIENT
Start: 2022-03-08 | End: 2022-07-25 | Stop reason: ALTCHOICE

## 2022-03-08 SDOH — ECONOMIC STABILITY: TRANSPORTATION INSECURITY
IN THE PAST 12 MONTHS, HAS THE LACK OF TRANSPORTATION KEPT YOU FROM MEDICAL APPOINTMENTS OR FROM GETTING MEDICATIONS?: NO

## 2022-03-08 SDOH — ECONOMIC STABILITY: HOUSING INSECURITY
IN THE LAST 12 MONTHS, WAS THERE A TIME WHEN YOU DID NOT HAVE A STEADY PLACE TO SLEEP OR SLEPT IN A SHELTER (INCLUDING NOW)?: NO

## 2022-03-08 SDOH — ECONOMIC STABILITY: FOOD INSECURITY: WITHIN THE PAST 12 MONTHS, YOU WORRIED THAT YOUR FOOD WOULD RUN OUT BEFORE YOU GOT MONEY TO BUY MORE.: NEVER TRUE

## 2022-03-08 SDOH — ECONOMIC STABILITY: INCOME INSECURITY: IN THE LAST 12 MONTHS, WAS THERE A TIME WHEN YOU WERE NOT ABLE TO PAY THE MORTGAGE OR RENT ON TIME?: NO

## 2022-03-08 SDOH — ECONOMIC STABILITY: INCOME INSECURITY: HOW HARD IS IT FOR YOU TO PAY FOR THE VERY BASICS LIKE FOOD, HOUSING, MEDICAL CARE, AND HEATING?: NOT HARD AT ALL

## 2022-03-08 SDOH — ECONOMIC STABILITY: TRANSPORTATION INSECURITY
IN THE PAST 12 MONTHS, HAS LACK OF TRANSPORTATION KEPT YOU FROM MEETINGS, WORK, OR FROM GETTING THINGS NEEDED FOR DAILY LIVING?: NO

## 2022-03-08 SDOH — ECONOMIC STABILITY: FOOD INSECURITY: WITHIN THE PAST 12 MONTHS, THE FOOD YOU BOUGHT JUST DIDN'T LAST AND YOU DIDN'T HAVE MONEY TO GET MORE.: NEVER TRUE

## 2022-03-08 NOTE — PROGRESS NOTES
3/8/22    Diallo Dowell  2000    Chief Complaint   Patient presents with    Infertility     pt wishes to discuss infertilty, stopped depo provera x 12/2019, stopped ocp x 4 months, partner has not fathered children.  Menstrual Problem     pt c/o irregular menses x 14 months, states had menses august, sept, oct, no menses in nov, dec and feb. lmp 1/20/22, lasting 7 days. Diallo Dowell is a 22 y.o. female who presents today for evaluation of irregular menses and obesity and anemia    Past Medical History:   Diagnosis Date    Anemia     Infertility, female     Irregular menses     Menorrhagia     Obesity        History reviewed. No pertinent surgical history. Social History     Tobacco Use    Smoking status: Never Smoker    Smokeless tobacco: Never Used   Vaping Use    Vaping Use: Never used   Substance Use Topics    Alcohol use: No    Drug use: No       History reviewed. No pertinent family history.     Current Outpatient Medications   Medication Sig Dispense Refill    letrozole (FEMARA) 2.5 MG tablet Take 2 tablets by mouth daily On cycles days 3-7 10 tablet 5    medroxyPROGESTERone (PROVERA) 10 MG tablet Take 2 tablets by mouth daily for 10 days 20 tablet 0    ferrous sulfate (IRON 325) 325 (65 Fe) MG tablet Take 1 tablet by mouth 2 times daily (with meals) 30 tablet 3    medroxyPROGESTERone (PROVERA) 10 MG tablet Take 2 tablets by mouth daily for 10 days 20 tablet 0    Prenatal Vit-Fe Fumarate-FA (PRENATAL VITAMINS) 28-0.8 MG TABS Take 1 tablet by mouth daily (any prenatal vitamin covered) 30 tablet 11    Prenatal Vit-Fe Fumarate-FA (PRENATAL VITAMINS) 28-0.8 MG TABS Take 1 tablet by mouth daily (any prenatal vitamin covered) 30 tablet 11    albuterol sulfate HFA (VENTOLIN HFA) 108 (90 Base) MCG/ACT inhaler Inhale 2 puffs into the lungs 4 times daily as needed for Wheezing (Patient not taking: Reported on 3/8/2022) 3 Inhaler 1    Spacer/Aero-Hold Chamber Bags MISC 1 Bag by Does not apply route 4 times daily (Patient not taking: Reported on 2022) 1 each 0    polyethylene glycol (GLYCOLAX) packet TAKE 1 PACKET (17 G) BY MOUTH DAILY, MIX WITH WATER (Patient taking differently: Take 17 g by mouth daily as needed TAKE 1 PACKET (17 G) BY MOUTH DAILY, MIX WITH WATER) 30 each 3     No current facility-administered medications for this visit.        No Known Allergies        Immunization History   Administered Date(s) Administered    DTaP 2001, 2001, 2001, 2002, 10/14/2005    HPV Quadrivalent (Gardasil) 2010, 2010, 2011    Hepatitis A 2007, 2009    Hepatitis B 2001, 2001, 2001    Hib, unspecified 2001, 2001, 2001, 2002    Influenza Nasal 2007, 2008, 10/15/2009    Influenza Virus Vaccine 10/26/2010, 10/04/2012, 10/26/2013, 10/03/2014, 2015    Influenza, Titus Jerald, IM, (6 mo and older Fluzone, Flulaval, Fluarix and 3 yrs and older Afluria) 10/12/2016    MMR 2002, 10/14/2005    Meningococcal MCV4P (Menactra) 2013, 2018    PPD Test 2019    Polio IPV (IPOL) 2001, 2001, 2002, 10/14/2005    Rotavirus Pentavalent (RotaTeq) 2001, 2001, 2002    Tdap (Boostrix, Adacel) 2013    Varicella (Varivax) 2007       Review of Systems    /86   Ht 5' 8\" (1.727 m)   Wt 269 lb (122 kg)   LMP 2022   BMI 40.90 kg/m²     Physical Exam    Results for orders placed or performed in visit on 22   POCT urine pregnancy   Result Value Ref Range    Preg Test, Ur neg     Control       2022 1152022 Ferritin [[id number]]    Blood    Component Value Units   Ferritin 33.7 ng/mL           2022 1157 2022 2113 Iron and TIBC [[id number]]    (Abnormal)   Blood    Component Value Units   Iron 26 Low  ug/dL   TIBC 400 ug/dL   Iron Saturation 7 Low  %           2022 01/06/2022 2047 CBC Auto Differential [[id number]]    (Abnormal)   Blood    Component Value Units   WBC 5.6 K/uL   RBC 5.03 M/uL   Hemoglobin 11.4 Low  g/dL   Hematocrit 35.6 Low  %   MCV 70.8 Low  fL   MCH 22.6 Low  pg   MCHC 32.0 g/dL   RDW 21.1 High  %   Platelets 188 K/uL   MPV 7.3 fL   Neutrophils % 48.3 %   Lymphocytes % 38.8 %   Monocytes % 8.4 %   Eosinophils % 3.7 %   Basophils % 0.8 %   Neutrophils Absolute 2.7 K/uL   Lymphocytes Absolute 2.2 K/uL   Monocytes Absolute 0.5 K/uL   Eosinophils Absolute 0.2 K/uL   Basophils Absolute 0.0 K/uL           11/02/2021 0858 11/03/2021 1642 Blood Smear Review [[id number]]  Component Value   Path Consult Reviewed            11/02/2021 0858 11/02/2021 1720 HCG Qualitative, Serum [[id number]]    Blood    Component Value   hCG Qual Negative           11/02/2021 0858 19/09/9083 7907 Follicle Stimulating Hormone [[id number]]    Blood    Component Value Units   FSH 6.4  mIU/mL           11/02/2021 0858 11/04/2021 1502 Testosterone, free, total [[id number]]    Blood    Component Value Units   Testosterone 43 ng/dL   Sex Hormone Binding 78 nmol/L   Testosterone, Free 4.3  pg/mL           11/02/2021 0858 11/02/2021 1753 TSH WITH REFLEX TO FT4 [[id number]]    Blood    Component Value Units   TSH Reflex FT4 3.06 uIU/mL           11/02/2021 0858 11/02/2021 1755 Prolactin [[id number]]    Blood    Component Value Units   Prolactin 8.0  ng/mL           11/02/2021 0858 11/03/2021 1641 CBC Auto Differential [[id number]]    (Abnormal)   Blood    Component Value Units   WBC 6.8 K/uL   RBC 4.98 M/uL   Hemoglobin 10.3 Low  g/dL   Hematocrit 33.1 Low  %   MCV 66.4 Low  fL   MCH 20.7 Low  pg   MCHC 31.2 g/dL   RDW 18.0 High  %   Platelets 253 NEVJ  K/uL   MPV 8.0 fL   PLATELET SLIDE REVIEW Increased    SLIDE REVIEW see below     Path Consult Yes     Neutrophils % 54.4 %   Lymphocytes % 38.7 %   Monocytes % 5.6 %   Eosinophils % 0.9 %   Basophils % 0.4 %   Neutrophils Absolute 3.7 K/uL Lymphocytes Absolute 2.6 K/uL   Monocytes Absolute 0.4 K/uL   Eosinophils Absolute 0.1 K/uL   Basophils Absolute 0.0 K/uL   Anisocytosis Occasional Abnormal     Polychromasia Occasional Abnormal     Poikilocytes 1+ Abnormal     Ovalocytes 1+ Abnormal             11/02/2021 0858 11/02/2021 1755 Iron and TIBC [[id number]]    (Abnormal)   Blood    Component Value Units   Iron 22 Low  ug/dL   TIBC 454 High  ug/dL   Iron Saturation 5 Low  %               ASSESSMENT AND PLAN   Diagnosis Orders   1. Irregular menses  POCT urine pregnancy    HCG Qualitative, Serum    Progesterone    Progesterone    letrozole (FEMARA) 2.5 MG tablet    medroxyPROGESTERone (PROVERA) 10 MG tablet   2. Morbid obesity ([address]     3. Iron deficiency anemia due to chronic blood loss       Risk of twins and ohss with letrzole  Continue pnv and iron    Also order for semen anlaysis      Return in about 6 months (around 9/8/2022).     Cici Muñoz MD

## 2022-07-21 NOTE — PROGRESS NOTES
7/21/22 - . LM concerning  surgery @ [facility] on  7/29/22. Please call the PAT Nurse for a phone assessment and surgery instructions.

## 2022-07-25 NOTE — PROGRESS NOTES
Surgery @ [facility] on 7/29/22 you will be called 7/28/22 with times               1. Do not eat or drink anything after midnight - unless instructed by your doctor prior to surgery. This includes                   no water, chewing gum or mints. 2. Follow your directions as prescribed by the doctor for your procedure and medications. 3. Check with your Doctor regarding stopping vitamins, supplements, blood thinners (Plavix, Coumadin, Lovenox, Effient, Pradaxa, Xarelto, Fragmin or                   other blood thinners) and follow their instructions. 4. Do not smoke, vape or use chewing tobacco morning of surgery. Do not drink any alcoholic beverages 24 hours prior to surgery. This includes NA Beer. No street drugs 7 days prior to surgery. 5. You may brush your teeth and gargle the morning of surgery. DO NOT SWALLOW WATER   6. You MUST make arrangements for a responsible adult to take you home after your surgery and be able to check on you every couple                   hours for the day. You will not be allowed to leave alone or drive yourself home. It is strongly suggested someone stay with you the first 24                   hrs. Your surgery will be cancelled if you do not have a ride home. 7. Please wear simple, loose fitting clothing to the hospital.  Scooby Ridley not bring valuables (money, credit cards, checkbooks, etc.) Do not wear any                   makeup (including no eye makeup) or nail polish on your fingers or toes. 8. DO NOT wear any jewelry or piercings on day of surgery. All body piercing jewelry must be removed. 9. If you have dentures, they will be removed before going to the OR; we will provide you a container. If you wear contact lenses or glasses,                  they will be removed; please bring a case for them. 10. If you  have a Living Will and Durable Power of  for Healthcare, please bring in a copy.            11. Please bring picture ID,  insurance card, paperwork from the doctors office    (H & P, Consent, & card for implantable devices). 12. Take a shower the morning of your procedure. Do not apply any make-up, deodorant, lotion, oil or powder. 13.  Enter thru the main entrance wearing a mask on the day of surgery.

## 2022-07-27 ENCOUNTER — ANESTHESIA EVENT (OUTPATIENT)
Dept: OPERATING ROOM | Age: 22
End: 2022-07-27
Payer: COMMERCIAL

## 2022-07-28 NOTE — ANESTHESIA PRE PROCEDURE
Department of Anesthesiology  Preprocedure Note       Name:  Bambi Hicks   Age:  25 y.o.  :  2000                                          MRN:  [id number]         Date:  2022      Surgeon: Lila Billingsley):  Desmond Friend MD    Procedure: Procedure(s):  RIGHT MYRINGOTOMY TUBE INSERTION  ADENOIDECTOMY    Medications prior to admission:   Prior to Admission medications    Medication Sig Start Date End Date Taking? Authorizing Provider   letrozole [facility]) 2.5 MG tablet Take 2 tablets by mouth daily On cycles days 3-7 3/8/22   Magalis Hollingsworth MD   ferrous sulfate (IRON 325) 325 (65 Fe) MG tablet Take 1 tablet by mouth 2 times daily (with meals) 21   Shania Beth MD       Current medications:    No current facility-administered medications for this encounter. Current Outpatient Medications   Medication Sig Dispense Refill    letrozole (FEMARA) 2.5 MG tablet Take 2 tablets by mouth daily On cycles days 3-7 10 tablet 5    ferrous sulfate (IRON 325) 325 (65 Fe) MG tablet Take 1 tablet by mouth 2 times daily (with meals) 30 tablet 3       Allergies:  No Known Allergies    Problem List:    Patient Active Problem List   Diagnosis Code    Rash R21    Irritable bowel syndrome with constipation K58.1    Pale R23.1    Exertional headache G44.84    Pneumonia due to COVID-19 virus U07.1, J12.82       Past Medical History:        Diagnosis Date    Anemia     Infertility, female     Irregular menses     Menorrhagia     Obesity        Past Surgical History:        Procedure Laterality Date    TYMPANOSTOMY TUBE PLACEMENT Bilateral 2019       Social History:    Social History     Tobacco Use    Smoking status: Never    Smokeless tobacco: Never   Substance Use Topics    Alcohol use:  No                                Counseling given: Not Answered      Vital Signs (Current):   Vitals:    22 0822   Weight: 220 lb (99.8 kg)   Height: 5' 8\" (1.727 m) BP Readings from Last 3 Encounters:   03/08/22 131/86   01/06/22 (!) 150/86   11/02/21 139/87       NPO Status:                                                                                 BMI:   Wt Readings from Last 3 Encounters:   03/08/22 269 lb (122 kg)   01/06/22 264 lb (119.7 kg)   11/02/21 264 lb (119.7 kg)     Body mass index is 33.45 kg/m². CBC:   Lab Results   Component Value Date/Time    WBC 5.6 01/06/2022 11:57 AM    RBC 5.03 01/06/2022 11:57 AM    HGB 11.4 01/06/2022 11:57 AM    HCT 35.6 01/06/2022 11:57 AM    MCV 70.8 01/06/2022 11:57 AM    RDW 21.1 01/06/2022 11:57 AM     01/06/2022 11:57 AM       CMP:   Lab Results   Component Value Date/Time     07/29/2021 08:54 PM    K 3.2 07/29/2021 08:54 PM    CL 96 07/29/2021 08:54 PM    CO2 20 07/29/2021 08:54 PM    BUN 4 07/29/2021 08:54 PM    CREATININE 0.6 07/29/2021 08:54 PM    GFRAA >60 07/29/2021 08:54 PM    AGRATIO 1.5 09/06/2018 03:31 PM    LABGLOM >60 07/29/2021 08:54 PM    GLUCOSE 120 07/29/2021 08:54 PM    PROT 7.0 04/05/2021 05:30 AM    CALCIUM 8.8 07/29/2021 08:54 PM    BILITOT 0.2 04/05/2021 05:30 AM    ALKPHOS 68 04/05/2021 05:30 AM    AST 13 04/05/2021 05:30 AM    ALT 12 04/05/2021 05:30 AM       POC Tests: No results for input(s): POCGLU, POCNA, POCK, POCCL, POCBUN, POCHEMO, POCHCT in the last 72 hours.     Coags:   Lab Results   Component Value Date/Time    PROTIME 14.9 04/01/2021 11:01 PM    INR 1.23 04/01/2021 11:01 PM    APTT 42.0 04/01/2021 11:01 PM       HCG (If Applicable):   Lab Results   Component Value Date    PREGTESTUR neg 03/08/2022        ABGs:   Lab Results   Component Value Date/Time    PO2ART 290 02/16/2019 11:00 AM    HNC5ZFF 28.0 02/16/2019 11:00 AM    HDU4XTL 20.9 02/16/2019 11:00 AM        Type & Screen (If Applicable):  No results found for: LABABO, LABRH    Drug/Infectious Status (If Applicable):  No results found for: HIV, HEPCAB    COVID-19 Screening (If Applicable): No results found for: COVID19        Anesthesia Evaluation  Patient summary reviewed  Airway:           Dental:          Pulmonary:                              Cardiovascular:                      Neuro/Psych:   (+) headaches:,             GI/Hepatic/Renal:             Endo/Other:    (+) blood dyscrasia: anemia:., .                 Abdominal:             Vascular: Other Findings:           Anesthesia Plan      general     ASA 2     (Chart review 7/28/22)  Induction: intravenous.                             LLUVIA Colin - PEEWEE   7/28/2022

## 2022-07-28 NOTE — PROGRESS NOTES
7/28/22 - Notified patient surgery @ [facility] on 7/29/22 @ 1230, arrival 1030. Understanding verbalized.

## 2022-07-29 ENCOUNTER — HOSPITAL ENCOUNTER (OUTPATIENT)
Age: 22
Setting detail: OUTPATIENT SURGERY
Discharge: HOME | End: 2022-07-29
Attending: OTOLARYNGOLOGY | Admitting: OTOLARYNGOLOGY
Payer: COMMERCIAL

## 2022-07-29 ENCOUNTER — ANESTHESIA (OUTPATIENT)
Dept: OPERATING ROOM | Age: 22
End: 2022-07-29
Payer: COMMERCIAL

## 2022-07-29 VITALS
TEMPERATURE: 97.6 F | OXYGEN SATURATION: 96 % | HEART RATE: 96 BPM | BODY MASS INDEX: 33.34 KG/M2 | HEIGHT: 68 IN | WEIGHT: 220 LBS | SYSTOLIC BLOOD PRESSURE: 116 MMHG | RESPIRATION RATE: 18 BRPM | DIASTOLIC BLOOD PRESSURE: 68 MMHG

## 2022-07-29 DIAGNOSIS — J35.2 ADENOID HYPERTROPHY: ICD-10-CM

## 2022-07-29 DIAGNOSIS — H69.81 ETD (EUSTACHIAN TUBE DYSFUNCTION), RIGHT: ICD-10-CM

## 2022-07-29 LAB — PREGNANCY TEST URINE, POC: NEGATIVE

## 2022-07-29 PROCEDURE — 6370000000 HC RX 637 (ALT 250 FOR IP): Performed by: OTOLARYNGOLOGY

## 2022-07-29 PROCEDURE — 3600000002 HC SURGERY LEVEL 2 BASE: Performed by: OTOLARYNGOLOGY

## 2022-07-29 PROCEDURE — 3700000001 HC ADD 15 MINUTES (ANESTHESIA): Performed by: OTOLARYNGOLOGY

## 2022-07-29 PROCEDURE — 88304 TISSUE EXAM BY PATHOLOGIST: CPT

## 2022-07-29 PROCEDURE — 3600000012 HC SURGERY LEVEL 2 ADDTL 15MIN: Performed by: OTOLARYNGOLOGY

## 2022-07-29 PROCEDURE — 2580000003 HC RX 258

## 2022-07-29 PROCEDURE — 2780000010 HC IMPLANT OTHER: Performed by: OTOLARYNGOLOGY

## 2022-07-29 PROCEDURE — 3700000000 HC ANESTHESIA ATTENDED CARE: Performed by: OTOLARYNGOLOGY

## 2022-07-29 PROCEDURE — 7100000000 HC PACU RECOVERY - FIRST 15 MIN: Performed by: OTOLARYNGOLOGY

## 2022-07-29 PROCEDURE — 2580000003 HC RX 258: Performed by: ANESTHESIOLOGY

## 2022-07-29 PROCEDURE — L8699 PROSTHETIC IMPLANT NOS: HCPCS | Performed by: OTOLARYNGOLOGY

## 2022-07-29 PROCEDURE — 6360000002 HC RX W HCPCS

## 2022-07-29 PROCEDURE — 2500000003 HC RX 250 WO HCPCS

## 2022-07-29 PROCEDURE — 81025 URINE PREGNANCY TEST: CPT

## 2022-07-29 PROCEDURE — 7100000010 HC PHASE II RECOVERY - FIRST 15 MIN: Performed by: OTOLARYNGOLOGY

## 2022-07-29 PROCEDURE — 7100000011 HC PHASE II RECOVERY - ADDTL 15 MIN: Performed by: OTOLARYNGOLOGY

## 2022-07-29 PROCEDURE — 7100000001 HC PACU RECOVERY - ADDTL 15 MIN: Performed by: OTOLARYNGOLOGY

## 2022-07-29 PROCEDURE — 2709999900 HC NON-CHARGEABLE SUPPLY: Performed by: OTOLARYNGOLOGY

## 2022-07-29 DEVICE — VENT TUBE 1040045 10PK ARMST GROM PAIR
Type: IMPLANTABLE DEVICE | Site: EAR | Status: FUNCTIONAL
Brand: ARMSTRONG

## 2022-07-29 RX ORDER — PROPOFOL 10 MG/ML
INJECTION, EMULSION INTRAVENOUS PRN
Status: DISCONTINUED | OUTPATIENT
Start: 2022-07-29 | End: 2022-07-29 | Stop reason: SDUPTHER

## 2022-07-29 RX ORDER — CIPROFLOXACIN AND DEXAMETHASONE 3; 1 MG/ML; MG/ML
3 SUSPENSION/ DROPS AURICULAR (OTIC) ONCE
Status: DISCONTINUED | OUTPATIENT
Start: 2022-07-29 | End: 2022-07-29 | Stop reason: ALTCHOICE

## 2022-07-29 RX ORDER — DEXAMETHASONE SODIUM PHOSPHATE 1 MG/ML
3 SOLUTION/ DROPS OPHTHALMIC ONCE
Status: DISCONTINUED | OUTPATIENT
Start: 2022-07-29 | End: 2022-07-29 | Stop reason: HOSPADM

## 2022-07-29 RX ORDER — GLYCOPYRROLATE 0.2 MG/ML
INJECTION INTRAMUSCULAR; INTRAVENOUS PRN
Status: DISCONTINUED | OUTPATIENT
Start: 2022-07-29 | End: 2022-07-29 | Stop reason: SDUPTHER

## 2022-07-29 RX ORDER — ACETAMINOPHEN AND CODEINE PHOSPHATE 300; 30 MG/1; MG/1
1-2 TABLET ORAL EVERY 4 HOURS PRN
Qty: 18 TABLET | Refills: 0 | Status: SHIPPED | OUTPATIENT
Start: 2022-07-29 | End: 2022-08-01

## 2022-07-29 RX ORDER — DEXAMETHASONE SODIUM PHOSPHATE 4 MG/ML
INJECTION, SOLUTION INTRA-ARTICULAR; INTRALESIONAL; INTRAMUSCULAR; INTRAVENOUS; SOFT TISSUE PRN
Status: DISCONTINUED | OUTPATIENT
Start: 2022-07-29 | End: 2022-07-29 | Stop reason: SDUPTHER

## 2022-07-29 RX ORDER — CIPROFLOXACIN AND DEXAMETHASONE 3; 1 MG/ML; MG/ML
4 SUSPENSION/ DROPS AURICULAR (OTIC) 2 TIMES DAILY
Qty: 7.5 ML | Refills: 2 | Status: SHIPPED | OUTPATIENT
Start: 2022-07-29 | End: 2022-08-01

## 2022-07-29 RX ORDER — CIPROFLOXACIN AND DEXAMETHASONE 3; 1 MG/ML; MG/ML
SUSPENSION/ DROPS AURICULAR (OTIC)
Status: COMPLETED | OUTPATIENT
Start: 2022-07-29 | End: 2022-07-29

## 2022-07-29 RX ORDER — CIPROFLOXACIN HYDROCHLORIDE 3.5 MG/ML
3 SOLUTION/ DROPS TOPICAL ONCE
Status: DISCONTINUED | OUTPATIENT
Start: 2022-07-29 | End: 2022-07-29 | Stop reason: HOSPADM

## 2022-07-29 RX ORDER — ROCURONIUM BROMIDE 10 MG/ML
INJECTION, SOLUTION INTRAVENOUS PRN
Status: DISCONTINUED | OUTPATIENT
Start: 2022-07-29 | End: 2022-07-29 | Stop reason: SDUPTHER

## 2022-07-29 RX ORDER — KETAMINE HCL 50MG/ML(1)
SYRINGE (ML) INTRAVENOUS PRN
Status: DISCONTINUED | OUTPATIENT
Start: 2022-07-29 | End: 2022-07-29 | Stop reason: SDUPTHER

## 2022-07-29 RX ORDER — SODIUM CHLORIDE, SODIUM LACTATE, POTASSIUM CHLORIDE, CALCIUM CHLORIDE 600; 310; 30; 20 MG/100ML; MG/100ML; MG/100ML; MG/100ML
INJECTION, SOLUTION INTRAVENOUS CONTINUOUS
Status: DISCONTINUED | OUTPATIENT
Start: 2022-07-29 | End: 2022-07-29 | Stop reason: HOSPADM

## 2022-07-29 RX ADMIN — DEXMEDETOMIDINE 20 MCG: 100 INJECTION, SOLUTION, CONCENTRATE INTRAVENOUS at 14:25

## 2022-07-29 RX ADMIN — ROCURONIUM BROMIDE 50 MG: 10 INJECTION, SOLUTION INTRAVENOUS at 14:25

## 2022-07-29 RX ADMIN — HYDROMORPHONE HYDROCHLORIDE 1 MG: 1 INJECTION, SOLUTION INTRAMUSCULAR; INTRAVENOUS; SUBCUTANEOUS at 15:05

## 2022-07-29 RX ADMIN — DEXAMETHASONE SODIUM PHOSPHATE 8 MG: 4 INJECTION, SOLUTION INTRAMUSCULAR; INTRAVENOUS at 14:29

## 2022-07-29 RX ADMIN — PROPOFOL 150 MG: 10 INJECTION, EMULSION INTRAVENOUS at 14:25

## 2022-07-29 RX ADMIN — SODIUM CHLORIDE, POTASSIUM CHLORIDE, SODIUM LACTATE AND CALCIUM CHLORIDE: 600; 310; 30; 20 INJECTION, SOLUTION INTRAVENOUS at 11:18

## 2022-07-29 RX ADMIN — GLYCOPYRROLATE 0.3 MG: 0.2 INJECTION, SOLUTION INTRAMUSCULAR; INTRAVENOUS at 14:18

## 2022-07-29 RX ADMIN — SUGAMMADEX 200 MG: 100 INJECTION, SOLUTION INTRAVENOUS at 14:58

## 2022-07-29 RX ADMIN — Medication 50 MG: at 14:25

## 2022-07-29 ASSESSMENT — PAIN SCALES - GENERAL
PAINLEVEL_OUTOF10: 0
PAINLEVEL_OUTOF10: 0

## 2022-07-29 ASSESSMENT — PAIN - FUNCTIONAL ASSESSMENT: PAIN_FUNCTIONAL_ASSESSMENT: 0-10

## 2022-07-29 NOTE — DISCHARGE INSTRUCTIONS
Use the ciprofloxacin and dexamethasone drops provided: 4 drops of each to the right ear twice a day for three days. If you were not provided with the drops, a prescription has been provided also. Keep the right ear dry. Regular diet. [facility]  [phone]    Do not drive, work around [address] [place], [place] [address] St or use equipment. Do not drink any alcoholic beverages. Do not smoke while alone. Avoid making important decisions. Plan to spend a quiet, relaxed evening @ home. Resume normal activities as you begin to feel better. Eat lightly for your first meal, then gradually increase your diet to what is normal for you. In case of nausea, avoid food and drink only clear liquids. Resume food as nausea ceases. Notify your surgeon if you experience fever, chills, large amount of bleeding, difficulty breathing, persistent nausea and vomiting or any other disturbing problem. Call for a follow-up appointment with your surgeon. Advance Care Planning  People with COVID-19 may have no symptoms, mild symptoms, such as fever, cough, and shortness of breath or they may have more severe illness, developing severe and fatal pneumonia. As a result, Advance Care Planning with attention to naming a health care decision maker (someone you trust to make healthcare decisions for you if you could not speak for yourself) and sharing other health care preferences is important BEFORE a possible health crisis. Please contact your Primary Care Provider to discuss Advance Care Planning.     Preventing the Spread of Coronavirus Disease 2019 in Homes and Residential Communities  For the most recent information go to [employer]    Prevention steps for People with confirmed or suspected COVID-19 (including persons under investigation) who do not need to be hospitalized  and   People with confirmed COVID-19 who were hospitalized and determined to be medically stable to go home    Your healthcare provider and public health staff will evaluate whether you can be cared for at home. If it is determined that you do not need to be hospitalized and can be isolated at home, you will be monitored by staff from your local or state health department. You should follow the prevention steps below until a healthcare provider or local or state health department says you can return to your normal activities. Stay home except to get medical care  People who are mildly ill with COVID-19 are able to isolate at home during their illness. You should restrict activities outside your home, except for getting medical care. Do not go to work, school, or public areas. Avoid using public transportation, ride-sharing, or taxis. Separate yourself from other people and animals in your home  People: As much as possible, you should stay in a specific room and away from other people in your home. Also, you should use a separate bathroom, if available. Animals: You should restrict contact with pets and other animals while you are sick with COVID-19, just like you would around other people. Although there have not been reports of pets or other animals becoming sick with COVID-19, it is still recommended that people sick with COVID-19 limit contact with animals until more information is known about the virus. When possible, have another member of your household care for your animals while you are sick. If you are sick with COVID-19, avoid contact with your pet, including petting, snuggling, being kissed or licked, and sharing food. If you must care for your pet or be around animals while you are sick, wash your hands before and after you interact with pets and wear a facemask. Call ahead before visiting your doctor  If you have a medical appointment, call the healthcare provider and tell them that you have or may have COVID-19.  This will help the healthcare providers office take steps to keep other people from getting infected or exposed. Wear a facemask  You should wear a facemask when you are around other people (e.g., sharing a room or vehicle) or pets and before you enter a healthcare providers office. If you are not able to wear a facemask (for example, because it causes trouble breathing), then people who live with you should not stay in the same room with you, or they should wear a facemask if they enter your room. Cover your coughs and sneezes  Cover your mouth and nose with a tissue when you cough or sneeze. Throw used tissues in a lined trash can. Immediately wash your hands with soap and water for at least 20 seconds or, if soap and water are not available, clean your hands with an alcohol-based hand  that contains at least 60% alcohol. Clean your hands often  Wash your hands often with soap and water for at least 20 seconds, especially after blowing your nose, coughing, or sneezing; going to the bathroom; and before eating or preparing food. If soap and water are not readily available, use an alcohol-based hand  with at least 60% alcohol, covering all surfaces of your hands and rubbing them together until they feel dry. Soap and water are the best option if hands are visibly dirty. Avoid touching your eyes, nose, and mouth with unwashed hands. Avoid sharing personal household items  You should not share dishes, drinking glasses, cups, eating utensils, towels, or bedding with other people or pets in your home. After using these items, they should be washed thoroughly with soap and water. Clean all high-touch surfaces everyday  High touch surfaces include counters, tabletops, doorknobs, bathroom fixtures, toilets, phones, keyboards, tablets, and bedside tables. Also, clean any surfaces that may have blood, stool, or body fluids on them. Use a household cleaning spray or wipe, according to the label instructions.  Labels contain instructions for safe and effective use of the cleaning product including precautions you should take when applying the product, such as wearing gloves and making sure you have good ventilation during use of the product. Monitor your symptoms  Seek prompt medical attention if your illness is worsening (e.g., difficulty breathing). Before seeking care, call your healthcare provider and tell them that you have, or are being evaluated for, COVID-19. Put on a facemask before you enter the facility. These steps will help the healthcare providers office to keep other people in the office or waiting room from getting infected or exposed. Ask your healthcare provider to call the local or state health department. Persons who are placed under active monitoring or facilitated self-monitoring should follow instructions provided by their local health department or occupational health professionals, as appropriate. When working with your local health department check their available hours. If you have a medical emergency and need to call 911, notify the dispatch personnel that you have, or are being evaluated for COVID-19. If possible, put on a facemask before emergency medical services arrive. Discontinuing home isolation  Patients with confirmed COVID-19 should remain under home isolation precautions until the risk of secondary transmission to others is thought to be low. The decision to discontinue home isolation precautions should be made on a case-by-case basis, in consultation with healthcare providers and state and local health departments.

## 2022-07-29 NOTE — ANESTHESIA PRE PROCEDURE
Department of Anesthesiology  Preprocedure Note       Name:  Rolando Billingsley   Age:  25 y.o.  :  2000                                          MRN:  [id number]         Date:  2022      Surgeon: Riley Honeycutt):  Martin France MD    Procedure: Procedure(s):  RIGHT MYRINGOTOMY TUBE INSERTION  ADENOIDECTOMY    Medications prior to admission:   Prior to Admission medications    Medication Sig Start Date End Date Taking? Authorizing Provider   letrozole [facility]) 2.5 MG tablet Take 2 tablets by mouth daily On cycles days 3-7 3/8/22   Pato Estrada MD   ferrous sulfate (IRON 325) 325 (65 Fe) MG tablet Take 1 tablet by mouth 2 times daily (with meals) 21   Stefan Beaulieu MD       Current medications:    Current Facility-Administered Medications   Medication Dose Route Frequency Provider Last Rate Last Admin    lactated ringers infusion   IntraVENous Continuous Eulionel Gudino MD 50 mL/hr at 22 1118 New Bag at 22 1118       Allergies:  No Known Allergies    Problem List:    Patient Active Problem List   Diagnosis Code    Rash R21    Irritable bowel syndrome with constipation K58.1    Pale R23.1    Exertional headache G44.84    Pneumonia due to COVID-19 virus U07.1, J12.82       Past Medical History:        Diagnosis Date    Anemia     Infertility, female     Irregular menses     Menorrhagia     Obesity        Past Surgical History:        Procedure Laterality Date    TYMPANOSTOMY TUBE PLACEMENT Bilateral 2019       Social History:    Social History     Tobacco Use    Smoking status: Never    Smokeless tobacco: Never   Substance Use Topics    Alcohol use:  No                                Counseling given: Not Answered      Vital Signs (Current):   Vitals:    22 0822 22 1107   BP:  134/86   Pulse:  (!) 106   Resp:  16   Temp:  36.4 °C (97.5 °F)   TempSrc:  Temporal   SpO2:  97%   Weight: 220 lb (99.8 kg)    Height: 5' 8\" (1.727 m) BP Readings from Last 3 Encounters:   07/29/22 134/86   03/08/22 131/86   01/06/22 (!) 150/86       NPO Status: Time of last liquid consumption: 1700                        Time of last solid consumption: 1700                        Date of last liquid consumption: 07/28/22                        Date of last solid food consumption: 07/28/22    BMI:   Wt Readings from Last 3 Encounters:   07/25/22 220 lb (99.8 kg)   03/08/22 269 lb (122 kg)   01/06/22 264 lb (119.7 kg)     Body mass index is 33.45 kg/m². CBC:   Lab Results   Component Value Date/Time    WBC 5.6 01/06/2022 11:57 AM    RBC 5.03 01/06/2022 11:57 AM    HGB 11.4 01/06/2022 11:57 AM    HCT 35.6 01/06/2022 11:57 AM    MCV 70.8 01/06/2022 11:57 AM    RDW 21.1 01/06/2022 11:57 AM     01/06/2022 11:57 AM       CMP:   Lab Results   Component Value Date/Time     07/29/2021 08:54 PM    K 3.2 07/29/2021 08:54 PM    CL 96 07/29/2021 08:54 PM    CO2 20 07/29/2021 08:54 PM    BUN 4 07/29/2021 08:54 PM    CREATININE 0.6 07/29/2021 08:54 PM    GFRAA >60 07/29/2021 08:54 PM    AGRATIO 1.5 09/06/2018 03:31 PM    LABGLOM >60 07/29/2021 08:54 PM    GLUCOSE 120 07/29/2021 08:54 PM    PROT 7.0 04/05/2021 05:30 AM    CALCIUM 8.8 07/29/2021 08:54 PM    BILITOT 0.2 04/05/2021 05:30 AM    ALKPHOS 68 04/05/2021 05:30 AM    AST 13 04/05/2021 05:30 AM    ALT 12 04/05/2021 05:30 AM       POC Tests: No results for input(s): POCGLU, POCNA, POCK, POCCL, POCBUN, POCHEMO, POCHCT in the last 72 hours. Coags:   Lab Results   Component Value Date/Time    PROTIME 14.9 04/01/2021 11:01 PM    INR 1.23 04/01/2021 11:01 PM    APTT 42.0 04/01/2021 11:01 PM       HCG (If Applicable):   Lab Results   Component Value Date    PREGTESTUR NEGATIVE 07/29/2022        ABGs:   Lab Results   Component Value Date/Time    PO2ART 290 02/16/2019 11:00 AM    QKD7WEA 28.0 02/16/2019 11:00 AM    CVJ4UOX 20.9 02/16/2019 11:00 AM        Type & Screen (If Applicable):   No results found for: James Jayden    Drug/Infectious Status (If Applicable):  No results found for: HIV, HEPCAB    COVID-19 Screening (If Applicable): No results found for: COVID19        Anesthesia Evaluation  Patient summary reviewed  Airway: Mallampati: II  TM distance: <3 FB   Neck ROM: full  Mouth opening: > = 3 FB   Dental: normal exam         Pulmonary:                              Cardiovascular:                      Neuro/Psych:   (+) headaches:,             GI/Hepatic/Renal:             Endo/Other:    (+) blood dyscrasia: anemia:., .                 Abdominal:             Vascular: Other Findings:           Anesthesia Plan      general     ASA 2     (Chart review 7/28/22)  Induction: intravenous. MIPS: Postoperative opioids intended. Anesthetic plan and risks discussed with patient.                         LLUVIA Lynch - CRNA   7/29/2022

## 2022-07-29 NOTE — PROGRESS NOTES
Outpatient Pharmacy Progress Note for Meds-to-Beds    Total number of Prescriptions Filled: 1  The following medications were dispensed to the patient during the discharge process:  Ciprofloxacin-dexamethazone otic suspension    Additional Documentation:  Patient's family member picked-up the medication(s) in the OP Pharmacy  Printed prescription not received by this pharmacy- family was instructed it can be filled at any pharmacy except [facility] with her insurance. Thank you for letting us serve your patients.   [facility]    [address], [address] [facility]    Phone: [phone]    Fax: [phone]

## 2022-07-29 NOTE — PROGRESS NOTES
1513 Patient arrived to PACU from OR. Monitors applied and alarms on. Report from [address]  3366 Patient turned and repositioned in bed. 1535 Patient tolerating ice chips. 1553 Patient transferred out of PACU to same day surgery. Report to Zoomy.

## 2022-07-29 NOTE — H&P
dysfunction    Plan: We are planning for adenoidectomy as well as right myringotomy with PE tube placement. I did advise the patient that it is possible that I will again not be able to get a PE tube in position but I will at least perform a myringotomy in a more favorable location of the tympanic membrane. All risks were discussed and the patient desires to proceed.

## 2022-07-29 NOTE — OP NOTE
Operative Note      Patient: Marga Guillen  YOB: 2000  MRN: [id number]    Date of Procedure: 7/29/2022    Pre-Op Diagnosis: ETD (Eustachian tube dysfunction), right [H69.81]  Adenoid hypertrophy [J35.2]  Right eustachian tube dysfunction    Post-Op Diagnosis: Same       Procedure(s):  RIGHT MYRINGOTOMY TUBE INSERTION  ADENOIDECTOMY    Surgeon(s):  Brianda Monzon MD    Assistant:   * No surgical staff found *    Anesthesia: General    Estimated Blood Loss (mL): Minimal    Complications: None    Specimens:   ID Type Source Tests Collected by Time Destination   A : ADENOID Tissue Adenoid SURGICAL PATHOLOGY Brianda Monzon MD 7/29/2022 1452        Implants:  Implant Name Type Inv. Item Serial No.  Lot No. LRB No. Used Action   TUBE INNR EAR MYR VENT FLNG BVL 1.14 MM ID FLROPLAS STRL - [id number]  TUBE INNR EAR MYR VENT FLNG BVL 1.14 MM ID FLROPLAS STRL  [employer] [id number] Right 1 Implanted         Drains: * No LDAs found *    Findings: Right middle ear serous effusion; Lopez tube was placed    Detailed Description of Procedure: The patient was identified of the procedure verified. After induction of anesthesia the right external auditory canal was examined under binocular microscopy. The tympanic membrane was found to be intact but retracted. I created a radial anterosuperior quadrant myringotomy. Serous fluid was suctioned. An Lopez tube was placed. I placed ciprofloxacin and dexamethasone drops. The patient was then repositioned. A head turban was placed. I used a [employer] used to expose the pharynx. The adenoid was visualized indirectly. The adenoid was removed using curettage and forceps. Hemostasis was obtained with pressure packing and electrocautery. The wounds were irrigated and checked for adequate hemostasis. The stomach was suctioned.     The patient was then allowed to emerge and anesthesia and following extubation was transferred to the recovery area in satisfactory condition. All counts were correct at the end of the case.     Electronically signed by Rosalva Benavidez MD on 7/29/2022 at 3:23 PM

## 2022-07-29 NOTE — PROGRESS NOTES
1553 Pt received from PACU and report received from [facility]. Pt denies c/o. Pt drowsy but awakens easily. Call light in reach. Mother at bedside. No drainage noted on cotton ball right ear. 1610 In on check on pt. Pt denies c/o. Mother at bedside. Call light in reach. RX sent to pharmacy. 26 Pt mother to go down to pick prescription. 36 Pt mother back in room and stated that RX for Tylenol 3 they never received and I looked in tube system still there and didn't send. Rx given to mother to have filled when they leave but picked up RX for ear drops from down stair RX. Pt mother very nice and states that she will get filled when she leaves. 36 Pt and mother given discharge instructions. Both verbalized understanding of instructions. Mother to get car. [phone] Pt up to side of bed. Pt tolerated well and ready to get dressed to go home. Pt denies c/o. Call light in reach. 1640 Pt discharged to home per wheelchair to mothers awaiting vehicle to drive pt home.

## 2022-07-30 NOTE — ANESTHESIA POSTPROCEDURE EVALUATION
Department of Anesthesiology  Postprocedure Note    Patient: Pham Isaacs  MRN: [id number]  YOB: 2000  Date of evaluation: 7/30/2022      Procedure Summary     Date: 07/29/22 Room / Location: [address] [place] [address] OR [address] [place] [address]    Anesthesia Start: 1417 Anesthesia Stop: 1517    Procedures:       RIGHT MYRINGOTOMY TUBE INSERTION (Right)      ADENOIDECTOMY Diagnosis:       ETD (Eustachian tube dysfunction), right      Adenoid hypertrophy      (ETD (Eustachian tube dysfunction), right [H69.81])      (Adenoid hypertrophy [J35.2])    Surgeons: Casper Baez MD Responsible Provider: Abigail Salinas MD    Anesthesia Type: General ASA Status: 2          Anesthesia Type: General    Otis Phase I: Otis Score: 10    Otis Phase II: Otis Score: 10      Anesthesia Post Evaluation    Patient location during evaluation: PACU  Patient participation: complete - patient participated  Level of consciousness: awake  Pain score: 3  Airway patency: patent  Nausea & Vomiting: no vomiting and no nausea  Complications: no  Cardiovascular status: blood pressure returned to baseline and hemodynamically stable  Respiratory status: acceptable  Hydration status: stable

## 2022-10-27 ENCOUNTER — OFFICE VISIT (OUTPATIENT)
Dept: OBGYN | Age: 22
End: 2022-10-27
Payer: COMMERCIAL

## 2022-10-27 VITALS
SYSTOLIC BLOOD PRESSURE: 129 MMHG | HEIGHT: 68 IN | BODY MASS INDEX: 40.62 KG/M2 | WEIGHT: 268 LBS | DIASTOLIC BLOOD PRESSURE: 91 MMHG

## 2022-10-27 DIAGNOSIS — N92.1 MENOMETRORRHAGIA: Primary | ICD-10-CM

## 2022-10-27 LAB
BASOPHILS ABSOLUTE: 0 K/UL (ref 0–0.2)
BASOPHILS RELATIVE PERCENT: 0.6 %
EOSINOPHILS ABSOLUTE: 0.1 K/UL (ref 0–0.6)
EOSINOPHILS RELATIVE PERCENT: 1 %
HCT VFR BLD CALC: 28.2 % (ref 36–48)
HEMOGLOBIN: 8.7 G/DL (ref 12–16)
LYMPHOCYTES ABSOLUTE: 2 K/UL (ref 1–5.1)
LYMPHOCYTES RELATIVE PERCENT: 27.5 %
MCH RBC QN AUTO: 22.2 PG (ref 26–34)
MCHC RBC AUTO-ENTMCNC: 30.9 G/DL (ref 31–36)
MCV RBC AUTO: 71.7 FL (ref 80–100)
MONOCYTES ABSOLUTE: 0.5 K/UL (ref 0–1.3)
MONOCYTES RELATIVE PERCENT: 6.8 %
NEUTROPHILS ABSOLUTE: 4.6 K/UL (ref 1.7–7.7)
NEUTROPHILS RELATIVE PERCENT: 64.1 %
PDW BLD-RTO: 16.7 % (ref 12.4–15.4)
PLATELET # BLD: 473 K/UL (ref 135–450)
PMV BLD AUTO: 7.1 FL (ref 5–10.5)
RBC # BLD: 3.93 M/UL (ref 4–5.2)
WBC # BLD: 7.2 K/UL (ref 4–11)

## 2022-10-27 PROCEDURE — 1036F TOBACCO NON-USER: CPT | Performed by: OBSTETRICS & GYNECOLOGY

## 2022-10-27 PROCEDURE — G8484 FLU IMMUNIZE NO ADMIN: HCPCS | Performed by: OBSTETRICS & GYNECOLOGY

## 2022-10-27 PROCEDURE — G8427 DOCREV CUR MEDS BY ELIG CLIN: HCPCS | Performed by: OBSTETRICS & GYNECOLOGY

## 2022-10-27 PROCEDURE — G8417 CALC BMI ABV UP PARAM F/U: HCPCS | Performed by: OBSTETRICS & GYNECOLOGY

## 2022-10-27 PROCEDURE — 99214 OFFICE O/P EST MOD 30 MIN: CPT | Performed by: OBSTETRICS & GYNECOLOGY

## 2022-10-27 RX ORDER — MEDROXYPROGESTERONE ACETATE 10 MG/1
20 TABLET ORAL DAILY
Qty: 42 TABLET | Refills: 0 | Status: SHIPPED | OUTPATIENT
Start: 2022-10-27 | End: 2022-11-17

## 2022-10-27 ASSESSMENT — ENCOUNTER SYMPTOMS
ALLERGIC/IMMUNOLOGIC NEGATIVE: 1
EYES NEGATIVE: 1
GASTROINTESTINAL NEGATIVE: 1
RESPIRATORY NEGATIVE: 1

## 2022-10-27 ASSESSMENT — PATIENT HEALTH QUESTIONNAIRE - PHQ9
2. FEELING DOWN, DEPRESSED OR HOPELESS: 0
SUM OF ALL RESPONSES TO PHQ QUESTIONS 1-9: 0
SUM OF ALL RESPONSES TO PHQ QUESTIONS 1-9: 0
1. LITTLE INTEREST OR PLEASURE IN DOING THINGS: 0
SUM OF ALL RESPONSES TO PHQ QUESTIONS 1-9: 0
SUM OF ALL RESPONSES TO PHQ9 QUESTIONS 1 & 2: 0
SUM OF ALL RESPONSES TO PHQ QUESTIONS 1-9: 0

## 2022-10-27 NOTE — PROGRESS NOTES
10/27/22    Michael Limon  2000    Chief Complaint   Patient presents with    Menorrhagia     C/o heavy bright red bleeding with clots and pain. States shes been bleeding heavy everyday since 2022. Pt bleeding heavy today requesting to wait for pap . prem Limon is a 25 y.o. female who presents today for evaluation of menometrorraghia. Heavy, bright red, large clots, + clots. Past Medical History:   Diagnosis Date    Anemia     Infertility, female     Irregular menses     Menorrhagia     Obesity        Past Surgical History:   Procedure Laterality Date    ADENOIDECTOMY N/A 2022    ADENOIDECTOMY performed by Ju Gamez MD at [facility] 226 Right 2022    RIGHT MYRINGOTOMY TUBE INSERTION performed by Ju Gamez MD at [address] [place] [address] Bilateral 2019       Social History     Tobacco Use    Smoking status: Never    Smokeless tobacco: Never   Vaping Use    Vaping Use: Never used   Substance Use Topics    Alcohol use: No    Drug use: No       No family history on file. Current Outpatient Medications   Medication Sig Dispense Refill    medroxyPROGESTERone (PROVERA) 10 MG tablet Take 2 tablets by mouth daily for 21 days 42 tablet 0    letrozole (FEMARA) 2.5 MG tablet Take 2 tablets by mouth daily On cycles days 3-7 (Patient not taking: Reported on 10/27/2022) 10 tablet 5    ferrous sulfate (IRON 325) 325 (65 Fe) MG tablet Take 1 tablet by mouth 2 times daily (with meals) (Patient not taking: Reported on 10/27/2022) 30 tablet 3     No current facility-administered medications for this visit.        No Known Allergies        Immunization History   Administered Date(s) Administered    DTaP 2001, 2001, 2001, 2002, 10/14/2005    HPV Quadrivalent (Gardasil) 2010, 2010, 2011    Hepatitis A 2007, 2009    Hepatitis B 2001, 2001, 2001    Hib, unspecified 2001, 2001, 2001, 04/18/2002    Influenza Nasal 11/21/2007, 11/04/2008, 10/15/2009    Influenza Virus Vaccine 10/26/2010, 10/04/2012, 10/26/2013, 10/03/2014, 09/25/2015    Influenza, AFLURIA (age 1 yrs+), FLUZONE, (age 10 mo+), MDV, 0.5mL 10/12/2016    MMR 04/18/2002, 10/14/2005    Meningococcal MCV4P (Menactra) 05/30/2013, 07/17/2018    PPD Test 03/12/2019    Polio IPV (IPOL) 2001, 2001, 04/18/2002, 10/14/2005    Rotavirus Pentavalent (RotaTeq) 2001, 2001, 04/18/2002    Tdap (Boostrix, Adacel) 05/30/2013    Varicella (Varivax) 11/21/2007       Review of Systems   Constitutional: Negative. HENT: Negative. Eyes: Negative. Respiratory: Negative. Cardiovascular: Negative. Gastrointestinal: Negative. Endocrine: Negative. Genitourinary:  Positive for menstrual problem. Musculoskeletal: Negative. Skin: Negative. Allergic/Immunologic: Negative. Neurological: Negative. Hematological: Negative. Psychiatric/Behavioral: Negative. BP (!) 129/91   Ht 5' 8\" (1.727 m)   Wt 268 lb (121.6 kg)   LMP 07/01/2022   BMI 40.75 kg/m²     Physical Exam  Constitutional:       General: She is not in acute distress. Appearance: Normal appearance. She is not ill-appearing. HENT:      Head: Normocephalic. Nose: Nose normal.   Eyes:      General: No scleral icterus. Right eye: No discharge. Left eye: No discharge. Cardiovascular:      Pulses: Normal pulses. Pulmonary:      Effort: Pulmonary effort is normal.   Abdominal:      General: Abdomen is flat. There is no distension. Palpations: Abdomen is soft. There is no mass. Tenderness: There is no abdominal tenderness. Hernia: No hernia is present. Musculoskeletal:         General: Normal range of motion. Cervical back: Normal range of motion and neck supple. No rigidity. Skin:     General: Skin is warm and dry. Neurological:      General: No focal deficit present. Mental Status: She is alert and oriented to person, place, and time. Psychiatric:         Mood and Affect: Mood normal.         Behavior: Behavior normal.       No results found for this visit on 10/27/22. ASSESSMENT AND PLAN   Diagnosis Orders   1. Menometrorrhagia  CBC with Auto Differential    Prolactin    TSH with Reflex to FT4    Testosterone, free, total    Follicle Stimulating Hormone    HCG Qualitative, Serum    US NON OB TRANSVAGINAL          Return in about 2 weeks (around 11/10/2022).     Eliezer Aguilar MD

## 2022-10-28 DIAGNOSIS — D50.9 MICROCYTIC ANEMIA: Primary | ICD-10-CM

## 2022-10-28 LAB
FOLLICLE STIMULATING HORMONE: 5.3 MIU/ML
HCG QUALITATIVE: NEGATIVE
PROLACTIN: 7 NG/ML
TSH REFLEX FT4: 2.1 UIU/ML (ref 0.27–4.2)

## 2022-10-28 RX ORDER — FERROUS SULFATE 325(65) MG
650 TABLET ORAL EVERY OTHER DAY
Qty: 30 TABLET | Refills: 3 | Status: SHIPPED | OUTPATIENT
Start: 2022-10-28 | End: 2022-11-27

## 2022-10-29 LAB
SEX HORMONE BINDING GLOBULIN: 76 NMOL/L (ref 30–135)
TESTOSTERONE FREE-NONMALE: 4.9 PG/ML (ref 0.8–7.4)
TESTOSTERONE TOTAL: 48 NG/DL (ref 20–70)

## 2022-11-02 ENCOUNTER — NURSE ONLY (OUTPATIENT)
Dept: OBGYN | Age: 22
End: 2022-11-02
Payer: COMMERCIAL

## 2022-11-02 DIAGNOSIS — D50.9 MICROCYTIC ANEMIA: ICD-10-CM

## 2022-11-02 PROCEDURE — 36415 COLL VENOUS BLD VENIPUNCTURE: CPT | Performed by: OBSTETRICS & GYNECOLOGY

## 2022-11-03 LAB
FERRITIN: 9.1 NG/ML (ref 15–150)
HCT VFR BLD CALC: 26.4 % (ref 36–48)
HEMOGLOBIN: 8.2 G/DL (ref 12–16)
IRON SATURATION: 7 % (ref 15–50)
IRON: 35 UG/DL (ref 37–145)
MCH RBC QN AUTO: 21.9 PG (ref 26–34)
MCHC RBC AUTO-ENTMCNC: 30.9 G/DL (ref 31–36)
MCV RBC AUTO: 70.8 FL (ref 80–100)
PDW BLD-RTO: 17.1 % (ref 12.4–15.4)
PLATELET # BLD: 454 K/UL (ref 135–450)
PMV BLD AUTO: 7.5 FL (ref 5–10.5)
RBC # BLD: 3.73 M/UL (ref 4–5.2)
TOTAL IRON BINDING CAPACITY: 473 UG/DL (ref 260–445)
WBC # BLD: 8.4 K/UL (ref 4–11)

## 2022-11-17 ENCOUNTER — OFFICE VISIT (OUTPATIENT)
Dept: OBGYN | Age: 22
End: 2022-11-17
Payer: COMMERCIAL

## 2022-11-17 VITALS
WEIGHT: 270 LBS | HEIGHT: 68 IN | DIASTOLIC BLOOD PRESSURE: 96 MMHG | BODY MASS INDEX: 40.92 KG/M2 | SYSTOLIC BLOOD PRESSURE: 147 MMHG

## 2022-11-17 DIAGNOSIS — N92.1 MENOMETRORRHAGIA: Primary | ICD-10-CM

## 2022-11-17 DIAGNOSIS — D50.0 IRON DEFICIENCY ANEMIA DUE TO CHRONIC BLOOD LOSS: ICD-10-CM

## 2022-11-17 PROBLEM — N93.9 ABNORMAL UTERINE BLEEDING (AUB): Status: ACTIVE | Noted: 2022-11-17

## 2022-11-17 PROCEDURE — G8484 FLU IMMUNIZE NO ADMIN: HCPCS | Performed by: OBSTETRICS & GYNECOLOGY

## 2022-11-17 PROCEDURE — 99214 OFFICE O/P EST MOD 30 MIN: CPT | Performed by: OBSTETRICS & GYNECOLOGY

## 2022-11-17 PROCEDURE — G8427 DOCREV CUR MEDS BY ELIG CLIN: HCPCS | Performed by: OBSTETRICS & GYNECOLOGY

## 2022-11-17 PROCEDURE — G8417 CALC BMI ABV UP PARAM F/U: HCPCS | Performed by: OBSTETRICS & GYNECOLOGY

## 2022-11-17 PROCEDURE — 1036F TOBACCO NON-USER: CPT | Performed by: OBSTETRICS & GYNECOLOGY

## 2022-11-17 RX ORDER — NORETHINDRONE ACETATE AND ETHINYL ESTRADIOL 1MG-20(21)
1 KIT ORAL DAILY
Qty: 1 PACKET | Refills: 11 | Status: SHIPPED | OUTPATIENT
Start: 2022-11-17

## 2022-11-17 NOTE — PROGRESS NOTES
22    Stephanie De La Cruz  2000    Chief Complaint   Patient presents with    Follow-up     Pt here to discuss u/s and lab results, hx of aub, states bleeding has stopped. Stephanie De La Cruz is a 25 y.o. female who presents today for evaluation of AUB. Menses heavy, bright red, lasted for months and never stopped    Past Medical History:   Diagnosis Date    Abnormal uterine bleeding (AUB)     Anemia     Infertility, female     Irregular menses     Menorrhagia     Obesity        Past Surgical History:   Procedure Laterality Date    ADENOIDECTOMY N/A 2022    ADENOIDECTOMY performed by Ruddy Al MD at [address] Right 2022    RIGHT MYRINGOTOMY TUBE INSERTION performed by Ruddy Al MD at [facility] Bilateral 2019       Social History     Tobacco Use    Smoking status: Never    Smokeless tobacco: Never   Vaping Use    Vaping Use: Never used   Substance Use Topics    Alcohol use: No    Drug use: No       No family history on file. Current Outpatient Medications   Medication Sig Dispense Refill    norethindrone-ethinyl estradiol (LOESTRIN FE ) 1-20 MG-MCG per tablet Take 1 tablet by mouth daily 1 packet 11    ferrous sulfate (IRON 325) 325 (65 Fe) MG tablet Take 2 tablets by mouth every other day 30 tablet 3    medroxyPROGESTERone (PROVERA) 10 MG tablet Take 2 tablets by mouth daily for 21 days 42 tablet 0     No current facility-administered medications for this visit.        No Known Allergies        Immunization History   Administered Date(s) Administered    DTaP 2001, 2001, 2001, 2002, 10/14/2005    HPV Quadrivalent (Gardasil) 2010, 2010, 2011    Hepatitis A 2007, 2009    Hepatitis B 2001, 2001, 2001    Hib, unspecified 2001, 2001, 2001, 2002    Influenza Nasal 2007, 2008, 10/15/2009    Influenza Virus Vaccine 10/26/2010, 10/04/2012, 10/26/2013, 10/03/2014, 09/25/2015    Influenza, AFLURIA (age 1 yrs+), FLUZONE, (age 10 mo+), MDV, 0.5mL 10/12/2016    MMR 04/18/2002, 10/14/2005    Meningococcal MCV4P (Menactra) 05/30/2013, 07/17/2018    PPD Test 03/12/2019    Polio IPV (IPOL) 2001, 2001, 04/18/2002, 10/14/2005    Rotavirus Pentavalent (RotaTeq) 2001, 2001, 04/18/2002    Tdap (Boostrix, Adacel) 05/30/2013    Varicella (Varivax) 11/21/2007       Review of Systems   Genitourinary:  Positive for menstrual problem. BP (!) 147/96   Ht 5' 8\" (1.727 m)   Wt 270 lb (122.5 kg)   BMI 41.05 kg/m²     Physical Exam  Constitutional:       General: She is not in acute distress. Appearance: Normal appearance. She is not ill-appearing. HENT:      Head: Normocephalic. Nose: Nose normal.   Eyes:      General: No scleral icterus. Right eye: No discharge. Left eye: No discharge. Cardiovascular:      Pulses: Normal pulses. Pulmonary:      Effort: Pulmonary effort is normal.   Abdominal:      General: Abdomen is flat. There is no distension. Palpations: Abdomen is soft. There is no mass. Tenderness: There is no abdominal tenderness. Hernia: No hernia is present. Musculoskeletal:         General: Normal range of motion. Cervical back: Normal range of motion and neck supple. No rigidity. Skin:     General: Skin is warm and dry. Neurological:      General: No focal deficit present. Mental Status: She is alert and oriented to person, place, and time. Psychiatric:         Mood and Affect: Mood normal.         Behavior: Behavior normal.       No results found for this visit on 11/17/22.   Collected Updated Procedure    11/02/2022 0830 11/03/2022 0849 CBC [[id number]]   (Abnormal)   Blood    Component Value Units   WBC 8.4 K/uL   RBC 3.73 Low  M/uL   Hemoglobin 8.2 Low  g/dL   Hematocrit 26.4 Low  %   MCV 70.8 Low  fL   MCH 21.9 Low  pg   MCHC 30.9 Low  g/dL   RDW 17.1 High %   Platelets 301 High  K/uL   MPV 7.5 fL          11/02/2022 0830 11/03/2022 0750 Iron and TIBC [[id number]]   (Abnormal)   Blood    Component Value Units   Iron 35 Low  ug/dL   TIBC 473 High  ug/dL   Iron Saturation 7 Low  %          11/02/2022 0830 11/03/2022 1239 Ferritin [[id number]]   (Abnormal)   Blood    Component Value Units   Ferritin 9.1 Low  ng/mL          10/27/2022 1109 10/28/2022 0127 HCG Qualitative, Serum [[id number]]   Blood    Component Value   hCG Qual Negative          10/27/2022 1109 26/87/8020 1756 Follicle Stimulating Hormone [[id number]]   Blood    Component Value Units   FSH 5.3  mIU/mL          10/27/2022 1109 10/29/2022 1351 Testosterone, free, total [[id number]]   Blood    Component Value Units   Testosterone 48 ng/dL   Sex Hormone Binding 76 nmol/L   Testosterone, Free 4.9  pg/mL          10/27/2022 1109 10/28/2022 0200 TSH with Reflex to FT4 [[id number]]   Blood    Component Value Units   TSH Reflex FT4 2.10 uIU/mL          10/27/2022 1109 10/28/2022 0641 Prolactin [[id number]]   Blood    Component Value Units   Prolactin 7.0  ng/mL          10/27/2022 1109 10/27/2022 2315 CBC with Auto Differential [[id number]]   (Abnormal)   Blood    Component Value Units   WBC 7.2 K/uL   RBC 3.93 Low  M/uL   Hemoglobin 8.7 Low  g/dL   Hematocrit 28.2 Low  %   MCV 71.7 Low  fL   MCH 22.2 Low  pg   MCHC 30.9 Low  g/dL   RDW 16.7 High  %   Platelets 959 High  K/uL   MPV 7.1 fL   Neutrophils % 64.1 %   Lymphocytes % 27.5 %   Monocytes % 6.8 %   Eosinophils % 1.0 %   Basophils % 0.6 %   Neutrophils Absolute 4.6 K/uL   Lymphocytes Absolute 2.0 K/uL   Monocytes Absolute 0.5 K/uL   Eosinophils Absolute 0.1 K/uL   Basophils Absolute 0.0 K/uL          07/29/2022 1452 08/01/2022 1044 Surgical Pathology [[id number]]    Tissue from Adenoid    Component Value   No component results            ASSESSMENT AND PLAN   Diagnosis Orders   1.  Menometrorrhagia  norethindrone-ethinyl estradiol (LOESTRIN FE 1/20) 1-20 MG-MCG per tablet    CBC with Auto Differential    Iron and TIBC      2. Iron deficiency anemia due to chronic blood loss  norethindrone-ethinyl estradiol (LOESTRIN FE 1/20) 1-20 MG-MCG per tablet    CBC with Auto Differential    Iron and TIBC        Discussed need to improve anemia prior to pregnancy  3 months of ocps, if anemia resolved, then fertility meds. Discussed DUB at length, possible component of pcos  Return in about 3 months (around 2/17/2023).     Angel Lin MD

## 2025-01-21 NOTE — ED TRIAGE NOTES
C/o cough and shortness of breath x 2 weeks patient states she had covid march/april this year on the discharge service for the patient. I have reviewed and made amendments to the documentation where necessary.

## (undated) DEVICE — SHEET,DRAPE,53X77,STERILE: Brand: MEDLINE

## (undated) DEVICE — DUAL LUMEN STOMACH TUBE: Brand: SALEM SUMP

## (undated) DEVICE — GAUZE,SPONGE,4"X4",16PLY,XRAY,STRL,LF: Brand: MEDLINE

## (undated) DEVICE — SYRINGE IRRIG 60ML SFT PLIABLE BLB EZ TO GRP 1 HND USE W/

## (undated) DEVICE — GLOVE SURG SZ 7 CRM LTX FREE POLYISOPRENE POLYMER BEAD ANTI

## (undated) DEVICE — GOWN,ECLIPSE,POLYRNF,BRTHSLV,L,30/CS: Brand: MEDLINE

## (undated) DEVICE — STERILE COTTON BALLS LARGE 5/P: Brand: MEDLINE

## (undated) DEVICE — TOWEL,OR,DSP,ST,BLUE,STD,6/PK,12PK/CS: Brand: MEDLINE

## (undated) DEVICE — PACK,BASIC,SIRUS,V: Brand: MEDLINE

## (undated) DEVICE — GLOVE SURG SZ 75 L12IN THK75MIL DK GRN LTX FREE

## (undated) DEVICE — TUBING, SUCTION, 3/16" X 10', STRAIGHT: Brand: MEDLINE

## (undated) DEVICE — BLADE MYR OFFSET 45DEG SPEAR TIP NAR SHFT W/ RND KNURLED

## (undated) DEVICE — SPONGE,TONSIL,DBL STRNG,XRAY,SM,7/8",ST: Brand: MEDLINE INDUSTRIES, INC.

## (undated) DEVICE — ELECTROSURGICAL SUCTION COAGULATOR 10FR

## (undated) DEVICE — KIT,ANTI FOG,W/SPONGE & FLUID,SOFT PACK: Brand: MEDLINE

## (undated) DEVICE — GLOVE ORANGE PI 7 1/2   MSG9075